# Patient Record
Sex: FEMALE | Race: WHITE | NOT HISPANIC OR LATINO | Employment: OTHER | URBAN - METROPOLITAN AREA
[De-identification: names, ages, dates, MRNs, and addresses within clinical notes are randomized per-mention and may not be internally consistent; named-entity substitution may affect disease eponyms.]

---

## 2019-06-26 ENCOUNTER — OFFICE VISIT (OUTPATIENT)
Dept: GYNECOLOGIC ONCOLOGY | Facility: CLINIC | Age: 59
End: 2019-06-26

## 2019-06-26 VITALS
WEIGHT: 197 LBS | OXYGEN SATURATION: 98 % | DIASTOLIC BLOOD PRESSURE: 73 MMHG | HEART RATE: 68 BPM | RESPIRATION RATE: 12 BRPM | BODY MASS INDEX: 32.82 KG/M2 | HEIGHT: 65 IN | SYSTOLIC BLOOD PRESSURE: 130 MMHG

## 2019-06-26 DIAGNOSIS — D68.9 BLOOD CLOTTING DISORDER (HCC): ICD-10-CM

## 2019-06-26 DIAGNOSIS — I25.10 CORONARY ARTERY DISEASE INVOLVING NATIVE CORONARY ARTERY OF NATIVE HEART WITHOUT ANGINA PECTORIS: ICD-10-CM

## 2019-06-26 DIAGNOSIS — Z79.82 ASPIRIN LONG-TERM USE: ICD-10-CM

## 2019-06-26 DIAGNOSIS — N85.02 COMPLEX ATYPICAL ENDOMETRIAL HYPERPLASIA: Primary | ICD-10-CM

## 2019-06-26 PROCEDURE — 99205 OFFICE O/P NEW HI 60 MIN: CPT | Performed by: OBSTETRICS & GYNECOLOGY

## 2019-06-26 NOTE — PROGRESS NOTES
Paty Marie  [id number]  1960      Reason for visit:  Complex endometrial hyperplasia with atypia on biopsy, postmenopausal bleeding    Consultation:  Patient is being seen at the request of Dr. Chadwick    History of present illness:  The patient is a 58 y.o. year old female who presents today for treatment and evaluation of the above issues.     Patient thinks that she went through menopause at 53 yo. She states that she has had light vaginal bleeding every 4-5 months for the past 3 years. She does have chronic neck, back pain and reports that she has had left sided intermittent left sided pelvic pain. It is a pressure like pain that can lasts up to a day. She also endorses hematuria. Denies fatigue, chest pain, shortness of air, nausea, vomiting, decreased appetite. A TVUS 19 showed endometrial thickness of 8.8 mm, endometrial biopsy was then performed that showed complex endometrial hyperplasia with atypia. She also complains of vaginal itching and was told that she has lichen sclerosis. This was last year and she used clobetasol cream at that time.     She does have coronary artery disease and is taking Aspirin 325mg daily.  She reports she may have antiphospholipid syndrome and developed a hematoma after cardiac catheterization and underwent work-up with Dr. Davalos at that time.  This was several years ago.  At the time of her cardiac catheterization she was noted to have 30% stenosis and required no other intervention.  She sees Dr. Tillman in [place] for cardiac issues.    For new patients, Pending sale to [facility] intake form from 19 was reviewed and confirmed today.    OBGYN History:  She is a .  She does not use HRT. She does not have a history of abnormal pap smears. Pap smear 2019 normal. Menopause at 51yo.       Oncologic History:   No history exists.         Past Medical History:   Diagnosis Date   • Arthritis    • Blood clotting disorder (CMS/HCC)    • Diabetes (CMS/HCC)    • Diastolic dysfunction     • Hypertension    • Lupus anticoagulant disorder (CMS/HCC)        Past Surgical History:   Procedure Laterality Date   • LAPAROSCOPIC TUBAL LIGATION     • NECK SURGERY     • SKIN CANCER EXCISION         MEDICATIONS: The current medication list was reviewed with the patient and updated in the EMR this date per the Medical Assistant. Medication dosages and frequencies were confirmed to be accurate.      Allergies:  has No Known Allergies.    Social History:   Social History     Socioeconomic History   • Marital status:      Spouse name: Not on file   • Number of children: 2   • Years of education: Not on file   • Highest education level: Not on file   Tobacco Use   • Smoking status: Light Tobacco Smoker     Types: Electronic Cigarette   Substance and Sexual Activity   • Alcohol use: No     Frequency: Never   • Drug use: Defer   • Sexual activity: Defer       Family History:  No family history on file.    Health Maintenance:    Health Maintenance   Topic Date Due   • ANNUAL PHYSICAL  07/05/1965   • PNEUMOCOCCAL VACCINE (19-64 MEDIUM RISK) (1 of 1 - PPSV23) 07/05/1981   • TDAP/TD VACCINES (1 - Tdap) 07/05/1981   • ZOSTER VACCINE (1 of 2) 07/05/2012   • HEPATITIS C SCREENING  06/26/2019   • COLONOSCOPY  06/26/2019   • INFLUENZA VACCINE  08/01/2019   • PAP SMEAR  06/26/2022         Review of Systems   Constitutional: Negative for activity change, appetite change and unexpected weight change.   HENT: Negative for congestion, sinus pressure, sinus pain, sneezing and sore throat.    Eyes: Negative for discharge and itching.   Respiratory: Negative for cough, shortness of breath and wheezing.    Cardiovascular: Negative for chest pain and palpitations.   Gastrointestinal: Negative for abdominal distention, abdominal pain, blood in stool, constipation, diarrhea, nausea and vomiting.   Endocrine: Negative for cold intolerance and heat intolerance.   Genitourinary: Positive for pelvic pain and vaginal bleeding.  "Negative for dysuria and frequency.   Musculoskeletal: Positive for back pain and neck pain. Negative for joint swelling.   Skin: Negative for color change and rash.   Allergic/Immunologic: Negative for environmental allergies and food allergies.   Neurological: Negative for weakness, light-headedness and numbness.   Hematological: Negative for adenopathy. Does not bruise/bleed easily.   Psychiatric/Behavioral: Negative for agitation, decreased concentration, dysphoric mood and hallucinations.       Physical Exam    Vitals:    06/26/19 1410   BP: 130/73   Pulse: 68   Resp: 12   SpO2: 98%   Weight: 89.4 kg (197 lb)   Height: 165.1 cm (65\")   PainSc:   5     Body mass index is 32.78 kg/m².    Wt Readings from Last 3 Encounters:   06/26/19 89.4 kg (197 lb)   09/29/14 85.7 kg (189 lb 0 oz)         GENERAL: Alert, well-appearing female appearing her stated age who is in no apparent distress.   HEENT: Sclera anicteric. Head normocephalic, atraumatic. Mucus membranes moist.   NECK: Trachea midline, supple, without masses.  No thyromegaly.   BREASTS: Deferred  CARDIOVASCULAR: Normal rate, regular rhythm, no murmurs, rubs, or gallops. No peripheral edema.  RESPIRATORY: Clear to auscultation bilaterally, normal respiratory effort  BACK:  No CVA tenderness, no vertebral tenderness on palpation  GASTROINTESTINAL:  Abdomen is soft, non-tender, non-distended, no rebound or guarding, no masses, or hernias. No HSM.    SKIN:  Warm, dry, well-perfused.  All visible areas intact.  No rashes, lesions, ulcers.  PSYCHIATRIC: AO x3, with appropriate affect, normal thought processes.  NEUROLOGIC: No focal deficits. Moves extremities well.  MUSCULOSKELETAL: Normal gait and station.   EXTREMITIES:   No cyanosis, clubbing, symmetric.  LYMPHATICS:  No cervical or inguinal adenopathy noted.     PELVIC exam:    GYNECOLOGIC:  External genitalia with white skin changes at vaginal introitus and in anal region. On speculum examination, the cervix " was free from lesion. On bimanual examination no mass was appreciated.  Uterus was normal in size and shape. There is no cervical motion or uterine tenderness. No cervical mass was palpated. Parametria were smooth. Rectovaginal exam was deferred.     PROCEDURES: none     Diagnostic Data:     No Images in the past 120 days found..    Lab Results   Component Value Date    WBC 5.75 09/08/2016    HGB 12.8 09/08/2016    HCT 40.7 09/08/2016    MCV 91.5 09/08/2016     09/08/2016    NEUTROABS 2.68 09/08/2016    GLUCOSE 101 09/08/2016    BUN 9 09/08/2016    CREATININE 0.79 09/08/2016    EGFRIFNONA 76 09/08/2016    EGFRIFAFRI  09/08/2016      Comment:      <15 Indicative of kidney failure.     09/08/2016    K 3.9 09/08/2016     09/08/2016    CO2 30.7 09/08/2016    CALCIUM 9.4 09/08/2016    ALBUMIN 4.50 09/08/2016    AST 24 09/08/2016    ALT 23 09/08/2016    BILITOT 0.4 09/08/2016     No results found for:         Assessment/Plan   This is a 58 y.o. woman with complex endometrial hyperplasia with atypia on biopsy, postmenopausal bleeding, and lichen sclerosus.  Comorbidities as noted below.    Complex endometrial hyperplasia with atypia and lichen sclerosus  Patient's risk of associated cancer with complex endometrial hyperplasia on endometrial biopsy is 25 to 40%.  This was discussed with her.    Patient was consented for robotic total laparoscopic hysterectomy, bilateral salpingo-oophorectomy, possible cancer staging, possible vulvar biopsy     Risks and benefits of surgery were discussed.  This included, but was not limited to, infection and bleeding like when the skin is cut; damage to surrounding structures; and incisional complications.  Risk of DVT was addressed for major surgeries.  Standard of care efforts to minimize these risks were reviewed.  Typical hospital stay and recovery were discussed as well as post-procedure precautions.  Surgical implications of chronic illnesses on recovery and  surgical outcome were reviewed.     Pain medication regimen for postoperative care was discussed.  Typical regimen and avoidance of narcotics was discussed.  Patient was educated that other factors, such as existing narcotic use, can impact postoperative pain management.      Risks and benefits of lymph node dissection were further discussed.  This included lymphocyst, hematoma, lymphedema, vascular injury, and nerve injury.    Patient verbalized understanding of the plan including the risks and benefits.  Appropriate perioperative testing including laboratory evaluation, EKG as clinically indicated, chest x-ray as clinically indicated, and preadmission evaluation were all ordered as a part of this patient's care.    Coronary artery disease  Patient is to contact and be cleared by cardiologist (Dr. Tillman in [place]) and discuss aspirin therapy with surgery.  Patient is currently on  mg daily.    Possible antiphospholipid syndrome  Patient reports that she developed a hematoma after cardiac catheterization which occurred a few years ago.  She was seen by Dr. Davalos and was started on aspirin for which she thinks was antiphospholipid syndrome.  I would like for her to call  and get clarification regarding her current aspirin dose perioperatively.  I would feel more comfortable if patient was on a lower dose of aspirin or off anticoagulation perioperatively as she may require lymph node dissection as a part of her surgery.  This places her increased risk for bleeding complications.    Pain assessment was performed today as a part of patient’s care.  For patients with pain related to surgery, gynecologic malignancy or cancer treatment, the plan is as noted in the assessment/plan.  For patients with pain not related to these issues, they are to seek any further needed care from a more appropriate provider, such as PCP.    No orders of the defined types were placed in this encounter.    FOLLOW UP: Surgery      I personally spent 60 minutes face-to-face with the patient of which >50% was spent performing counseling/coordiantion of care.    Patient was seen and examined with Dr. Santiago,  resident, who performed portions of the examination and documentation for this patient's care under my direct supervision.  I agree with the above documentation and plan.    Jennifer Holman MD  06/27/19  7:52 AM

## 2019-06-27 PROBLEM — I25.10 CORONARY ARTERY DISEASE INVOLVING NATIVE CORONARY ARTERY OF NATIVE HEART WITHOUT ANGINA PECTORIS: Status: ACTIVE | Noted: 2019-06-27

## 2019-06-27 PROBLEM — Z79.82 ASPIRIN LONG-TERM USE: Status: ACTIVE | Noted: 2019-06-27

## 2019-06-27 PROBLEM — D68.9 BLOOD CLOTTING DISORDER (HCC): Status: ACTIVE | Noted: 2019-06-27

## 2019-06-27 PROBLEM — D68.62 LUPUS ANTICOAGULANT DISORDER (HCC): Status: ACTIVE | Noted: 2019-06-27

## 2019-06-28 DIAGNOSIS — N85.02 COMPLEX ATYPICAL ENDOMETRIAL HYPERPLASIA: ICD-10-CM

## 2019-06-28 DIAGNOSIS — Z79.82 ASPIRIN LONG-TERM USE: Primary | ICD-10-CM

## 2019-07-02 ENCOUNTER — TELEPHONE (OUTPATIENT)
Dept: GYNECOLOGIC ONCOLOGY | Facility: CLINIC | Age: 59
End: 2019-07-02

## 2019-07-02 NOTE — TELEPHONE ENCOUNTER
Returned call to [facility] with Dr. Davalos's office, states pt has never been seen there before, they received some paperwork on her but she was never seen.

## 2019-07-02 NOTE — TELEPHONE ENCOUNTER
Phoned pt regarding being seen by Dr. Davalos, I told her her [facility] said she had never been seen there before, pt states she has, she has to go into town for a script, she will go by their office to straighten it out and call me back.

## 2019-07-05 ENCOUNTER — TELEPHONE (OUTPATIENT)
Dept: GYNECOLOGIC ONCOLOGY | Facility: CLINIC | Age: 59
End: 2019-07-05

## 2019-07-11 ENCOUNTER — TELEPHONE (OUTPATIENT)
Dept: GYNECOLOGIC ONCOLOGY | Facility: CLINIC | Age: 59
End: 2019-07-11

## 2019-07-17 ENCOUNTER — TELEPHONE (OUTPATIENT)
Dept: GYNECOLOGIC ONCOLOGY | Facility: CLINIC | Age: 59
End: 2019-07-17

## 2019-07-17 NOTE — TELEPHONE ENCOUNTER
Phoned pt regarding appointment with Dr. Davalos, pt states went to their office after speaking with me that day and was seen yesterday by Dr. Davalos and clearance will be faxed when ready.  I asked her why they said they had never seen her, she said they had changed systems and did not have her records.  She also has an appointment with Dr. Houston on 8-8-19 for clearance, tentative date for surgery set for 8-30-19 per pt's request.

## 2019-08-12 ENCOUNTER — TELEPHONE (OUTPATIENT)
Dept: GYNECOLOGIC ONCOLOGY | Facility: CLINIC | Age: 59
End: 2019-08-12

## 2019-08-20 ENCOUNTER — TELEPHONE (OUTPATIENT)
Dept: GYNECOLOGIC ONCOLOGY | Facility: CLINIC | Age: 59
End: 2019-08-20

## 2019-08-21 ENCOUNTER — PREP FOR SURGERY (OUTPATIENT)
Dept: GYNECOLOGIC ONCOLOGY | Facility: CLINIC | Age: 59
End: 2019-08-21

## 2019-08-21 DIAGNOSIS — L90.0 LICHEN SCLEROSUS: ICD-10-CM

## 2019-08-21 DIAGNOSIS — N85.02 COMPLEX ATYPICAL ENDOMETRIAL HYPERPLASIA: Primary | ICD-10-CM

## 2019-08-21 RX ORDER — ACETAMINOPHEN 325 MG/1
650 TABLET ORAL ONCE
Status: CANCELLED | OUTPATIENT
Start: 2019-08-21

## 2019-08-21 RX ORDER — SODIUM CHLORIDE, SODIUM LACTATE, POTASSIUM CHLORIDE, CALCIUM CHLORIDE 600; 310; 30; 20 MG/100ML; MG/100ML; MG/100ML; MG/100ML
100 INJECTION, SOLUTION INTRAVENOUS CONTINUOUS
Status: CANCELLED | OUTPATIENT
Start: 2019-08-21

## 2019-08-21 RX ORDER — PREGABALIN 25 MG/1
150 CAPSULE ORAL ONCE
Status: CANCELLED | OUTPATIENT
Start: 2019-08-21 | End: 2019-08-21

## 2019-08-21 RX ORDER — CELECOXIB 100 MG/1
200 CAPSULE ORAL ONCE
Status: CANCELLED | OUTPATIENT
Start: 2019-08-21

## 2019-08-23 ENCOUNTER — PATIENT EDUCATION (SURGERY INSTRUCTIONS) (OUTPATIENT)
Dept: GYNECOLOGIC ONCOLOGY | Facility: CLINIC | Age: 59
End: 2019-08-23

## 2019-08-23 NOTE — PATIENT INSTRUCTIONS
Gynecologic Oncology  Inpatient Pre-op Patient Education  *See checked boxes for your instructions*    Patient Name:  Paty Marie  [id number]  1960    Surgeon:      Appointment  [x]  1. Your surgery has been scheduled on 8-30-19. You will need to be at the second floor surgery registration of the [facility] hospital on that day at 6:00 am. Enter the campus grounds through entrance #2, park in [facility], walk up the hill into the hospital and follow that holder until you see elevators on right, use that elevator to go to the 2nd floor, when the door opens, you will see an arrow to direct you to registration.   [x] 2.  You have a pre-admission testing (PAT) appointment for labs and possibly chest xray and EKG, on 8-29-19 at 3:00 pm, you do not need to be fasting for that appointment.  You will need to be at hospital registration on the first floor, 10 minutes before that time.     [x] 3.  The hospital registration department is located in the long hallway between the [facility] and [facility] buildings.     The Day(s) Before Surgery  [x] 1. On 8-29-19, the day prior to surgery, eat lightly.  No solid food after midnight on 8-29-19, including NO MILK, CREAM, OR ORANGE JUICE.  You may have sips of clear fluids up until two-three hours prior to your arrival to the hospital on the morning of surgery.     [] 2.  You may need to use a stool softener, the day prior to surgery to help with existing constipation and to clean out your bowels.  You can purchase Miralax over-the-counter at the pharmacy and follow the directions on the back.  (Do not do this step unless the box is checked).   [x] 3.  Do not take vitamins or full dose aspirin one week before surgery.  If you normally take a blood thinning medication such as Warfarin, Eliquis, or Xarelto, we will give you specific instructions regarding these medications and we may need to talk with your other doctors.   [x] 4.  On the morning of your surgery, you may likely take  your routine prescription medications with a sip of water as reviewed with you by your surgeon.  Bring your home medications with you to the hospital as we may need to reference these.  In particular be sure to bring any inhalers.     Post-surgery Instructions  [x] 1.  The length of stay for your type of surgery is typically one hospital night, however it is also possible that you could be discharged home in the evening on the same day depending on the nature of your surgery.  All rooms are private, so family member may stay with you.     [x] 2.  Do not take your own home prescription medication while you are in the hospital unless otherwise instructed.  These will be provided to you.         Comments:  Please remove fingernail polish, you may leave toenail polish on.

## 2019-08-28 ENCOUNTER — DOCUMENTATION (OUTPATIENT)
Dept: GYNECOLOGIC ONCOLOGY | Facility: CLINIC | Age: 59
End: 2019-08-28

## 2019-08-28 ENCOUNTER — TELEPHONE (OUTPATIENT)
Dept: GYNECOLOGIC ONCOLOGY | Facility: CLINIC | Age: 59
End: 2019-08-28

## 2019-08-28 RX ORDER — CLONAZEPAM 0.5 MG/1
0.5 TABLET ORAL 3 TIMES DAILY PRN
COMMUNITY

## 2019-08-28 RX ORDER — METFORMIN HYDROCHLORIDE 500 MG/1
500 TABLET, EXTENDED RELEASE ORAL 2 TIMES DAILY
COMMUNITY

## 2019-08-28 RX ORDER — ASPIRIN 325 MG
325 TABLET ORAL DAILY
COMMUNITY
End: 2020-05-14

## 2019-08-28 RX ORDER — POTASSIUM CHLORIDE 750 MG/1
10 TABLET, FILM COATED, EXTENDED RELEASE ORAL 2 TIMES DAILY
COMMUNITY

## 2019-08-28 RX ORDER — LISINOPRIL 10 MG/1
10 TABLET ORAL DAILY
COMMUNITY

## 2019-08-28 RX ORDER — LEVOTHYROXINE SODIUM 0.05 MG/1
75 TABLET ORAL DAILY
COMMUNITY
End: 2021-12-20

## 2019-08-28 RX ORDER — HYDROCHLOROTHIAZIDE 12.5 MG/1
12.5 TABLET ORAL DAILY
Status: ON HOLD | COMMUNITY
End: 2019-08-30

## 2019-08-28 RX ORDER — ATORVASTATIN CALCIUM 40 MG/1
40 TABLET, FILM COATED ORAL DAILY
COMMUNITY

## 2019-08-28 NOTE — TELEPHONE ENCOUNTER
Returned pt's call, states has been having blood pressure problems, it has been dropping when she stands, they have adjusted medications, she has appointment today at 2430 with PCP, instructed pt needs to call with update after sees PCP, pt v/u, will do as instructed.

## 2019-08-28 NOTE — TELEPHONE ENCOUNTER
I called and spoke with a nurse. She states mixing studies are normal and they will fax a clearance to our office. I provided the number and will wait on that.

## 2019-08-29 ENCOUNTER — TELEPHONE (OUTPATIENT)
Dept: GYNECOLOGIC ONCOLOGY | Facility: CLINIC | Age: 59
End: 2019-08-29

## 2019-08-29 ENCOUNTER — APPOINTMENT (OUTPATIENT)
Dept: PREADMISSION TESTING | Facility: HOSPITAL | Age: 59
End: 2019-08-29

## 2019-08-29 ENCOUNTER — ANESTHESIA EVENT (OUTPATIENT)
Dept: PERIOP | Facility: HOSPITAL | Age: 59
End: 2019-08-29

## 2019-08-29 RX ORDER — FAMOTIDINE 10 MG/ML
20 INJECTION, SOLUTION INTRAVENOUS ONCE
Status: CANCELLED | OUTPATIENT
Start: 2019-08-29 | End: 2019-08-29

## 2019-08-29 NOTE — TELEPHONE ENCOUNTER
Phoned pt's daughter Sofia since pt not available, informed her per Dr. Holman's v/o she may have bleeding issues due to not stopping the ASA soon enough, Sofia v/u, will inform her Mom.

## 2019-08-30 ENCOUNTER — ANESTHESIA (OUTPATIENT)
Dept: PERIOP | Facility: HOSPITAL | Age: 59
End: 2019-08-30

## 2019-08-30 ENCOUNTER — APPOINTMENT (OUTPATIENT)
Dept: GENERAL RADIOLOGY | Facility: HOSPITAL | Age: 59
End: 2019-08-30

## 2019-08-30 ENCOUNTER — HOSPITAL ENCOUNTER (OUTPATIENT)
Facility: HOSPITAL | Age: 59
Setting detail: SURGERY ADMIT
Discharge: HOME | End: 2019-08-30
Attending: OBSTETRICS & GYNECOLOGY | Admitting: OBSTETRICS & GYNECOLOGY

## 2019-08-30 VITALS
HEART RATE: 54 BPM | HEIGHT: 65 IN | TEMPERATURE: 97.6 F | OXYGEN SATURATION: 94 % | SYSTOLIC BLOOD PRESSURE: 123 MMHG | RESPIRATION RATE: 18 BRPM | WEIGHT: 197 LBS | DIASTOLIC BLOOD PRESSURE: 68 MMHG | BODY MASS INDEX: 32.82 KG/M2

## 2019-08-30 DIAGNOSIS — L90.0 LICHEN SCLEROSUS: ICD-10-CM

## 2019-08-30 DIAGNOSIS — N85.02 COMPLEX ATYPICAL ENDOMETRIAL HYPERPLASIA: ICD-10-CM

## 2019-08-30 LAB
ABO GROUP BLD: NORMAL
ALBUMIN SERPL-MCNC: 4.1 G/DL (ref 3.5–5.2)
ALBUMIN/GLOB SERPL: 1.7 G/DL
ALP SERPL-CCNC: 61 U/L (ref 39–117)
ALT SERPL W P-5'-P-CCNC: 15 U/L (ref 1–33)
ANION GAP SERPL CALCULATED.3IONS-SCNC: 12 MMOL/L (ref 5–15)
AST SERPL-CCNC: 18 U/L (ref 1–32)
BASOPHILS # BLD AUTO: 0.07 10*3/MM3 (ref 0–0.2)
BASOPHILS NFR BLD AUTO: 0.8 % (ref 0–1.5)
BILIRUB SERPL-MCNC: 0.2 MG/DL (ref 0.2–1.2)
BLD GP AB SCN SERPL QL: NEGATIVE
BUN BLD-MCNC: 11 MG/DL (ref 6–20)
BUN/CREAT SERPL: 13.3 (ref 7–25)
CALCIUM SPEC-SCNC: 9.2 MG/DL (ref 8.6–10.5)
CHLORIDE SERPL-SCNC: 105 MMOL/L (ref 98–107)
CO2 SERPL-SCNC: 26 MMOL/L (ref 22–29)
CREAT BLD-MCNC: 0.83 MG/DL (ref 0.57–1)
DEPRECATED RDW RBC AUTO: 43.3 FL (ref 37–54)
EOSINOPHIL # BLD AUTO: 1.13 10*3/MM3 (ref 0–0.4)
EOSINOPHIL NFR BLD AUTO: 13 % (ref 0.3–6.2)
ERYTHROCYTE [DISTWIDTH] IN BLOOD BY AUTOMATED COUNT: 13.1 % (ref 12.3–15.4)
GFR SERPL CREATININE-BSD FRML MDRD: 71 ML/MIN/1.73
GLOBULIN UR ELPH-MCNC: 2.4 GM/DL
GLUCOSE BLD-MCNC: 136 MG/DL (ref 65–99)
GLUCOSE BLDC GLUCOMTR-MCNC: 121 MG/DL (ref 70–130)
HCT VFR BLD AUTO: 40 % (ref 34–46.6)
HGB BLD-MCNC: 12.4 G/DL (ref 12–15.9)
IMM GRANULOCYTES # BLD AUTO: 0.02 10*3/MM3 (ref 0–0.05)
IMM GRANULOCYTES NFR BLD AUTO: 0.2 % (ref 0–0.5)
LYMPHOCYTES # BLD AUTO: 2.72 10*3/MM3 (ref 0.7–3.1)
LYMPHOCYTES NFR BLD AUTO: 31.3 % (ref 19.6–45.3)
MCH RBC QN AUTO: 28.2 PG (ref 26.6–33)
MCHC RBC AUTO-ENTMCNC: 31 G/DL (ref 31.5–35.7)
MCV RBC AUTO: 91.1 FL (ref 79–97)
MONOCYTES # BLD AUTO: 0.7 10*3/MM3 (ref 0.1–0.9)
MONOCYTES NFR BLD AUTO: 8.1 % (ref 5–12)
NEUTROPHILS # BLD AUTO: 4.04 10*3/MM3 (ref 1.7–7)
NEUTROPHILS NFR BLD AUTO: 46.6 % (ref 42.7–76)
NRBC BLD AUTO-RTO: 0 /100 WBC (ref 0–0.2)
PLATELET # BLD AUTO: 353 10*3/MM3 (ref 140–450)
PMV BLD AUTO: 10.4 FL (ref 6–12)
POTASSIUM BLD-SCNC: 4 MMOL/L (ref 3.5–5.2)
PROT SERPL-MCNC: 6.5 G/DL (ref 6–8.5)
RBC # BLD AUTO: 4.39 10*6/MM3 (ref 3.77–5.28)
RH BLD: POSITIVE
SODIUM BLD-SCNC: 143 MMOL/L (ref 136–145)
T&S EXPIRATION DATE: NORMAL
WBC NRBC COR # BLD: 8.68 10*3/MM3 (ref 3.4–10.8)

## 2019-08-30 PROCEDURE — 25010000002 FENTANYL CITRATE (PF) 100 MCG/2ML SOLUTION: Performed by: NURSE ANESTHETIST, CERTIFIED REGISTERED

## 2019-08-30 PROCEDURE — 88309 TISSUE EXAM BY PATHOLOGIST: CPT | Performed by: OBSTETRICS & GYNECOLOGY

## 2019-08-30 PROCEDURE — 86850 RBC ANTIBODY SCREEN: CPT | Performed by: OBSTETRICS & GYNECOLOGY

## 2019-08-30 PROCEDURE — 56606 BIOPSY OF VULVA/PERINEUM: CPT | Performed by: OBSTETRICS & GYNECOLOGY

## 2019-08-30 PROCEDURE — 58571 TLH W/T/O 250 G OR LESS: CPT | Performed by: OBSTETRICS & GYNECOLOGY

## 2019-08-30 PROCEDURE — 71045 X-RAY EXAM CHEST 1 VIEW: CPT

## 2019-08-30 PROCEDURE — 85025 COMPLETE CBC W/AUTO DIFF WBC: CPT | Performed by: OBSTETRICS & GYNECOLOGY

## 2019-08-30 PROCEDURE — 25010000002 PROPOFOL 10 MG/ML EMULSION: Performed by: NURSE ANESTHETIST, CERTIFIED REGISTERED

## 2019-08-30 PROCEDURE — 56605 BIOPSY OF VULVA/PERINEUM: CPT | Performed by: OBSTETRICS & GYNECOLOGY

## 2019-08-30 PROCEDURE — 80053 COMPREHEN METABOLIC PANEL: CPT | Performed by: OBSTETRICS & GYNECOLOGY

## 2019-08-30 PROCEDURE — 25010000002 NEOSTIGMINE 10 MG/10ML SOLUTION: Performed by: NURSE ANESTHETIST, CERTIFIED REGISTERED

## 2019-08-30 PROCEDURE — 86900 BLOOD TYPING SEROLOGIC ABO: CPT

## 2019-08-30 PROCEDURE — S0260 H&P FOR SURGERY: HCPCS | Performed by: OBSTETRICS & GYNECOLOGY

## 2019-08-30 PROCEDURE — 93005 ELECTROCARDIOGRAM TRACING: CPT | Performed by: OBSTETRICS & GYNECOLOGY

## 2019-08-30 PROCEDURE — 25010000002 DEXAMETHASONE PER 1 MG: Performed by: NURSE ANESTHETIST, CERTIFIED REGISTERED

## 2019-08-30 PROCEDURE — 25010000002 PROPOFOL 1000 MG/ML EMULSION: Performed by: NURSE ANESTHETIST, CERTIFIED REGISTERED

## 2019-08-30 PROCEDURE — 86900 BLOOD TYPING SEROLOGIC ABO: CPT | Performed by: OBSTETRICS & GYNECOLOGY

## 2019-08-30 PROCEDURE — 88304 TISSUE EXAM BY PATHOLOGIST: CPT | Performed by: OBSTETRICS & GYNECOLOGY

## 2019-08-30 PROCEDURE — 88331 PATH CONSLTJ SURG 1 BLK 1SPC: CPT | Performed by: PATHOLOGY

## 2019-08-30 PROCEDURE — 82962 GLUCOSE BLOOD TEST: CPT

## 2019-08-30 PROCEDURE — 86901 BLOOD TYPING SEROLOGIC RH(D): CPT | Performed by: OBSTETRICS & GYNECOLOGY

## 2019-08-30 PROCEDURE — 86901 BLOOD TYPING SEROLOGIC RH(D): CPT

## 2019-08-30 PROCEDURE — 25010000002 PROMETHAZINE PER 50 MG: Performed by: NURSE ANESTHETIST, CERTIFIED REGISTERED

## 2019-08-30 RX ORDER — FAMOTIDINE 20 MG/1
20 TABLET, FILM COATED ORAL ONCE
Status: COMPLETED | OUTPATIENT
Start: 2019-08-30 | End: 2019-08-30

## 2019-08-30 RX ORDER — SENNOSIDES 8.6 MG
650 CAPSULE ORAL EVERY 8 HOURS PRN
Qty: 30 TABLET | Refills: 0 | Status: SHIPPED | OUTPATIENT
Start: 2019-08-30 | End: 2019-09-19

## 2019-08-30 RX ORDER — EPHEDRINE SULFATE 50 MG/ML
5 INJECTION, SOLUTION INTRAVENOUS ONCE AS NEEDED
Status: DISCONTINUED | OUTPATIENT
Start: 2019-08-30 | End: 2019-08-30 | Stop reason: HOSPADM

## 2019-08-30 RX ORDER — HYDROCODONE BITARTRATE AND ACETAMINOPHEN 10; 325 MG/1; MG/1
1 TABLET ORAL ONCE AS NEEDED
Status: COMPLETED | OUTPATIENT
Start: 2019-08-30 | End: 2019-08-30

## 2019-08-30 RX ORDER — DEXAMETHASONE SODIUM PHOSPHATE 4 MG/ML
INJECTION, SOLUTION INTRA-ARTICULAR; INTRALESIONAL; INTRAMUSCULAR; INTRAVENOUS; SOFT TISSUE AS NEEDED
Status: DISCONTINUED | OUTPATIENT
Start: 2019-08-30 | End: 2019-08-30 | Stop reason: SURG

## 2019-08-30 RX ORDER — PROMETHAZINE HYDROCHLORIDE 25 MG/ML
12.5 INJECTION, SOLUTION INTRAMUSCULAR; INTRAVENOUS ONCE AS NEEDED
Status: DISCONTINUED | OUTPATIENT
Start: 2019-08-30 | End: 2019-08-30 | Stop reason: HOSPADM

## 2019-08-30 RX ORDER — LIDOCAINE HYDROCHLORIDE 10 MG/ML
0.5 INJECTION, SOLUTION EPIDURAL; INFILTRATION; INTRACAUDAL; PERINEURAL ONCE AS NEEDED
Status: COMPLETED | OUTPATIENT
Start: 2019-08-30 | End: 2019-08-30

## 2019-08-30 RX ORDER — PROPOFOL 10 MG/ML
VIAL (ML) INTRAVENOUS AS NEEDED
Status: DISCONTINUED | OUTPATIENT
Start: 2019-08-30 | End: 2019-08-30 | Stop reason: SURG

## 2019-08-30 RX ORDER — FENTANYL CITRATE 50 UG/ML
50 INJECTION, SOLUTION INTRAMUSCULAR; INTRAVENOUS
Status: DISCONTINUED | OUTPATIENT
Start: 2019-08-30 | End: 2019-08-30 | Stop reason: HOSPADM

## 2019-08-30 RX ORDER — GLYCOPYRROLATE 0.2 MG/ML
INJECTION INTRAMUSCULAR; INTRAVENOUS AS NEEDED
Status: DISCONTINUED | OUTPATIENT
Start: 2019-08-30 | End: 2019-08-30 | Stop reason: SURG

## 2019-08-30 RX ORDER — HYDROCODONE BITARTRATE AND ACETAMINOPHEN 10; 325 MG/1; MG/1
1 TABLET ORAL EVERY 6 HOURS PRN
COMMUNITY
End: 2019-08-30 | Stop reason: HOSPADM

## 2019-08-30 RX ORDER — NALOXONE HCL 0.4 MG/ML
0.4 VIAL (ML) INJECTION AS NEEDED
Status: DISCONTINUED | OUTPATIENT
Start: 2019-08-30 | End: 2019-08-30 | Stop reason: HOSPADM

## 2019-08-30 RX ORDER — ACETAMINOPHEN 325 MG/1
650 TABLET ORAL ONCE
Status: COMPLETED | OUTPATIENT
Start: 2019-08-30 | End: 2019-08-30

## 2019-08-30 RX ORDER — LIDOCAINE HYDROCHLORIDE 10 MG/ML
INJECTION, SOLUTION EPIDURAL; INFILTRATION; INTRACAUDAL; PERINEURAL AS NEEDED
Status: DISCONTINUED | OUTPATIENT
Start: 2019-08-30 | End: 2019-08-30 | Stop reason: SURG

## 2019-08-30 RX ORDER — NEOSTIGMINE METHYLSULFATE 1 MG/ML
INJECTION, SOLUTION INTRAVENOUS AS NEEDED
Status: DISCONTINUED | OUTPATIENT
Start: 2019-08-30 | End: 2019-08-30 | Stop reason: SURG

## 2019-08-30 RX ORDER — OXYCODONE HYDROCHLORIDE 5 MG/1
5 TABLET ORAL EVERY 4 HOURS PRN
Qty: 10 TABLET | Refills: 0 | Status: SHIPPED | OUTPATIENT
Start: 2019-08-30 | End: 2019-09-19

## 2019-08-30 RX ORDER — CEFAZOLIN SODIUM IN 0.9 % NACL 3 G/100 ML
3 INTRAVENOUS SOLUTION, PIGGYBACK (ML) INTRAVENOUS ONCE
Status: COMPLETED | OUTPATIENT
Start: 2019-08-30 | End: 2019-08-30

## 2019-08-30 RX ORDER — PREGABALIN 150 MG/1
150 CAPSULE ORAL ONCE
Status: COMPLETED | OUTPATIENT
Start: 2019-08-30 | End: 2019-08-30

## 2019-08-30 RX ORDER — PROMETHAZINE HYDROCHLORIDE 25 MG/1
12.5 TABLET ORAL EVERY 6 HOURS PRN
Status: DISCONTINUED | OUTPATIENT
Start: 2019-08-30 | End: 2019-08-30 | Stop reason: HOSPADM

## 2019-08-30 RX ORDER — LABETALOL HYDROCHLORIDE 5 MG/ML
5 INJECTION, SOLUTION INTRAVENOUS
Status: DISCONTINUED | OUTPATIENT
Start: 2019-08-30 | End: 2019-08-30 | Stop reason: HOSPADM

## 2019-08-30 RX ORDER — SODIUM CHLORIDE, SODIUM LACTATE, POTASSIUM CHLORIDE, CALCIUM CHLORIDE 600; 310; 30; 20 MG/100ML; MG/100ML; MG/100ML; MG/100ML
100 INJECTION, SOLUTION INTRAVENOUS CONTINUOUS
Status: DISCONTINUED | OUTPATIENT
Start: 2019-08-30 | End: 2019-08-30 | Stop reason: HOSPADM

## 2019-08-30 RX ORDER — SODIUM CHLORIDE 9 MG/ML
INJECTION, SOLUTION INTRAVENOUS AS NEEDED
Status: DISCONTINUED | OUTPATIENT
Start: 2019-08-30 | End: 2019-08-30 | Stop reason: HOSPADM

## 2019-08-30 RX ORDER — LABETALOL HYDROCHLORIDE 5 MG/ML
INJECTION, SOLUTION INTRAVENOUS AS NEEDED
Status: DISCONTINUED | OUTPATIENT
Start: 2019-08-30 | End: 2019-08-30 | Stop reason: SURG

## 2019-08-30 RX ORDER — HYDROMORPHONE HYDROCHLORIDE 1 MG/ML
0.5 INJECTION, SOLUTION INTRAMUSCULAR; INTRAVENOUS; SUBCUTANEOUS
Status: DISCONTINUED | OUTPATIENT
Start: 2019-08-30 | End: 2019-08-30 | Stop reason: HOSPADM

## 2019-08-30 RX ORDER — BUPIVACAINE HYDROCHLORIDE AND EPINEPHRINE 5; 5 MG/ML; UG/ML
INJECTION, SOLUTION PERINEURAL AS NEEDED
Status: DISCONTINUED | OUTPATIENT
Start: 2019-08-30 | End: 2019-08-30 | Stop reason: HOSPADM

## 2019-08-30 RX ORDER — PROMETHAZINE HYDROCHLORIDE 12.5 MG/1
12.5 TABLET ORAL EVERY 6 HOURS PRN
Qty: 5 TABLET | Refills: 0 | Status: SHIPPED | OUTPATIENT
Start: 2019-08-30 | End: 2019-09-19

## 2019-08-30 RX ORDER — ROCURONIUM BROMIDE 10 MG/ML
INJECTION, SOLUTION INTRAVENOUS AS NEEDED
Status: DISCONTINUED | OUTPATIENT
Start: 2019-08-30 | End: 2019-08-30 | Stop reason: SURG

## 2019-08-30 RX ORDER — DOCUSATE SODIUM 250 MG
250 CAPSULE ORAL 2 TIMES DAILY
Qty: 60 CAPSULE | Refills: 5 | Status: SHIPPED | OUTPATIENT
Start: 2019-08-30 | End: 2019-09-19

## 2019-08-30 RX ORDER — PROMETHAZINE HYDROCHLORIDE 12.5 MG/1
12.5 SUPPOSITORY RECTAL EVERY 6 HOURS PRN
Status: DISCONTINUED | OUTPATIENT
Start: 2019-08-30 | End: 2019-08-30 | Stop reason: HOSPADM

## 2019-08-30 RX ORDER — IBUPROFEN 600 MG/1
600 TABLET ORAL EVERY 6 HOURS PRN
Qty: 30 TABLET | Refills: 1 | Status: SHIPPED | OUTPATIENT
Start: 2019-08-30 | End: 2019-09-19

## 2019-08-30 RX ORDER — CELECOXIB 200 MG/1
200 CAPSULE ORAL ONCE
Status: COMPLETED | OUTPATIENT
Start: 2019-08-30 | End: 2019-08-30

## 2019-08-30 RX ORDER — FENTANYL CITRATE 50 UG/ML
INJECTION, SOLUTION INTRAMUSCULAR; INTRAVENOUS AS NEEDED
Status: DISCONTINUED | OUTPATIENT
Start: 2019-08-30 | End: 2019-08-30 | Stop reason: SURG

## 2019-08-30 RX ORDER — MEPERIDINE HYDROCHLORIDE 25 MG/ML
12.5 INJECTION INTRAMUSCULAR; INTRAVENOUS; SUBCUTANEOUS
Status: DISCONTINUED | OUTPATIENT
Start: 2019-08-30 | End: 2019-08-30 | Stop reason: HOSPADM

## 2019-08-30 RX ORDER — SODIUM CHLORIDE 0.9 % (FLUSH) 0.9 %
3-10 SYRINGE (ML) INJECTION AS NEEDED
Status: DISCONTINUED | OUTPATIENT
Start: 2019-08-30 | End: 2019-08-30 | Stop reason: HOSPADM

## 2019-08-30 RX ORDER — SCOLOPAMINE TRANSDERMAL SYSTEM 1 MG/1
1 PATCH, EXTENDED RELEASE TRANSDERMAL ONCE
Status: DISCONTINUED | OUTPATIENT
Start: 2019-08-30 | End: 2019-08-30 | Stop reason: HOSPADM

## 2019-08-30 RX ORDER — MAGNESIUM HYDROXIDE 1200 MG/15ML
LIQUID ORAL AS NEEDED
Status: DISCONTINUED | OUTPATIENT
Start: 2019-08-30 | End: 2019-08-30 | Stop reason: HOSPADM

## 2019-08-30 RX ORDER — SODIUM CHLORIDE 0.9 % (FLUSH) 0.9 %
3 SYRINGE (ML) INJECTION EVERY 12 HOURS SCHEDULED
Status: DISCONTINUED | OUTPATIENT
Start: 2019-08-30 | End: 2019-08-30 | Stop reason: HOSPADM

## 2019-08-30 RX ORDER — SODIUM CHLORIDE, SODIUM LACTATE, POTASSIUM CHLORIDE, CALCIUM CHLORIDE 600; 310; 30; 20 MG/100ML; MG/100ML; MG/100ML; MG/100ML
9 INJECTION, SOLUTION INTRAVENOUS CONTINUOUS
Status: DISCONTINUED | OUTPATIENT
Start: 2019-08-30 | End: 2019-08-30 | Stop reason: HOSPADM

## 2019-08-30 RX ORDER — PROMETHAZINE HYDROCHLORIDE 25 MG/ML
INJECTION, SOLUTION INTRAMUSCULAR; INTRAVENOUS AS NEEDED
Status: DISCONTINUED | OUTPATIENT
Start: 2019-08-30 | End: 2019-08-30 | Stop reason: SURG

## 2019-08-30 RX ADMIN — LABETALOL HYDROCHLORIDE 5 MG: 5 INJECTION, SOLUTION INTRAVENOUS at 08:53

## 2019-08-30 RX ADMIN — METOPROLOL TARTRATE 2.5 MG: 5 INJECTION, SOLUTION INTRAVENOUS at 08:37

## 2019-08-30 RX ADMIN — ACETAMINOPHEN 650 MG: 500 TABLET ORAL at 07:30

## 2019-08-30 RX ADMIN — PROPOFOL 150 MG: 10 INJECTION, EMULSION INTRAVENOUS at 08:00

## 2019-08-30 RX ADMIN — NEOSTIGMINE METHYLSULFATE 3 MG: 1 INJECTION, SOLUTION INTRAVENOUS at 09:49

## 2019-08-30 RX ADMIN — FENTANYL CITRATE 50 MCG: 50 INJECTION INTRAMUSCULAR; INTRAVENOUS at 10:30

## 2019-08-30 RX ADMIN — LABETALOL HYDROCHLORIDE 5 MG: 5 INJECTION, SOLUTION INTRAVENOUS at 08:45

## 2019-08-30 RX ADMIN — PREGABALIN 150 MG: 150 CAPSULE ORAL at 07:33

## 2019-08-30 RX ADMIN — LIDOCAINE HYDROCHLORIDE 0.2 ML: 10 INJECTION, SOLUTION EPIDURAL; INFILTRATION; INTRACAUDAL; PERINEURAL at 07:18

## 2019-08-30 RX ADMIN — HYDROCODONE BITARTRATE AND ACETAMINOPHEN 1 TABLET: 10; 325 TABLET ORAL at 11:40

## 2019-08-30 RX ADMIN — SODIUM CHLORIDE, POTASSIUM CHLORIDE, SODIUM LACTATE AND CALCIUM CHLORIDE: 600; 310; 30; 20 INJECTION, SOLUTION INTRAVENOUS at 09:30

## 2019-08-30 RX ADMIN — DEXAMETHASONE SODIUM PHOSPHATE 8 MG: 4 INJECTION, SOLUTION INTRAMUSCULAR; INTRAVENOUS at 08:00

## 2019-08-30 RX ADMIN — SODIUM CHLORIDE, POTASSIUM CHLORIDE, SODIUM LACTATE AND CALCIUM CHLORIDE 9 ML/HR: 600; 310; 30; 20 INJECTION, SOLUTION INTRAVENOUS at 07:18

## 2019-08-30 RX ADMIN — FENTANYL CITRATE 50 MCG: 50 INJECTION INTRAMUSCULAR; INTRAVENOUS at 10:45

## 2019-08-30 RX ADMIN — SODIUM CHLORIDE, POTASSIUM CHLORIDE, SODIUM LACTATE AND CALCIUM CHLORIDE 250 ML: 600; 310; 30; 20 INJECTION, SOLUTION INTRAVENOUS at 11:18

## 2019-08-30 RX ADMIN — FENTANYL CITRATE 100 MCG: 50 INJECTION, SOLUTION INTRAMUSCULAR; INTRAVENOUS at 08:00

## 2019-08-30 RX ADMIN — FAMOTIDINE 20 MG: 20 TABLET ORAL at 07:34

## 2019-08-30 RX ADMIN — CELECOXIB 200 MG: 200 CAPSULE ORAL at 07:33

## 2019-08-30 RX ADMIN — GLYCOPYRROLATE 0.4 MG: 0.2 INJECTION, SOLUTION INTRAMUSCULAR; INTRAVENOUS at 09:49

## 2019-08-30 RX ADMIN — LABETALOL HYDROCHLORIDE 5 MG: 5 INJECTION, SOLUTION INTRAVENOUS at 08:58

## 2019-08-30 RX ADMIN — LABETALOL HYDROCHLORIDE 5 MG: 5 INJECTION, SOLUTION INTRAVENOUS at 08:49

## 2019-08-30 RX ADMIN — PROPOFOL 25 MCG/KG/MIN: 10 INJECTION, EMULSION INTRAVENOUS at 08:05

## 2019-08-30 RX ADMIN — PROMETHAZINE HYDROCHLORIDE 12.5 MG: 25 INJECTION INTRAMUSCULAR; INTRAVENOUS at 08:03

## 2019-08-30 RX ADMIN — Medication 3 G: at 07:56

## 2019-08-30 RX ADMIN — SCOPALAMINE 1 PATCH: 1 PATCH, EXTENDED RELEASE TRANSDERMAL at 07:34

## 2019-08-30 RX ADMIN — ROCURONIUM BROMIDE 50 MG: 10 INJECTION INTRAVENOUS at 08:00

## 2019-08-30 RX ADMIN — METOPROLOL TARTRATE 2.5 MG: 5 INJECTION, SOLUTION INTRAVENOUS at 08:32

## 2019-08-30 RX ADMIN — FENTANYL CITRATE 50 MCG: 50 INJECTION INTRAMUSCULAR; INTRAVENOUS at 10:20

## 2019-08-30 RX ADMIN — LIDOCAINE HYDROCHLORIDE 50 MG: 10 INJECTION, SOLUTION EPIDURAL; INFILTRATION; INTRACAUDAL; PERINEURAL at 08:00

## 2019-09-01 ENCOUNTER — NURSE TRIAGE (OUTPATIENT)
Dept: CALL CENTER | Facility: HOSPITAL | Age: 59
End: 2019-09-01

## 2019-09-01 NOTE — TELEPHONE ENCOUNTER
"Caller is calling about her pain medication, only received 10 Roxicodone, has following instructions for break thru pain and wanting to know when she needs to resume her Asprin. Will be calling her surgeon    Reason for Disposition  • Caller has URGENT medication question about med that PCP prescribed and triager unable to answer question    Additional Information  • Negative: Drug overdose and nurse unable to answer question  • Negative: Caller requesting information not related to medicine  • Negative: Caller requesting a prescription for Strep throat and has a positive culture result  • Negative: Rash while taking a medication or within 3 days of stopping it  • Negative: Immunization reaction suspected  • Negative: [1] Asthma and [2] having symptoms of asthma (cough, wheezing, etc)  • Negative: MORE THAN A DOUBLE DOSE of a prescription or over-the-counter (OTC) drug  • Negative: [1] DOUBLE DOSE (an extra dose or lesser amount) of over-the-counter (OTC) drug AND [2] any symptoms (e.g., dizziness, nausea, pain, sleepiness)  • Negative: [1] DOUBLE DOSE (an extra dose or lesser amount) of prescription drug AND [2] any symptoms (e.g., dizziness, nausea, pain, sleepiness)  • Negative: Took another person's prescription drug  • Negative: [1] DOUBLE DOSE (an extra dose or lesser amount) of prescription drug AND [2] NO symptoms (Exception: a double dose of antibiotics)  • Negative: Diabetes drug error or overdose (e.g., insulin or extra dose)  • Negative: [1] Request for URGENT new prescription or refill of \"essential\" medication (i.e., likelihood of harm to patient if not taken) AND [2] triager unable to fill per unit policy  • Negative: [1] Prescription not at pharmacy AND [2] was prescribed today by PCP  • Negative: Pharmacy calling with prescription questions and triager unable to answer question    Answer Assessment - Initial Assessment Questions  1. SYMPTOMS: \"Do you have any symptoms?\"   only got 10 pain pills of " "the Roxicodone  2. SEVERITY: If symptoms are present, ask \"Are they mild, moderate or severe?\"      Moderate to severe    Protocols used: MEDICATION QUESTION CALL-ADULT-      "

## 2019-09-05 ENCOUNTER — TELEPHONE (OUTPATIENT)
Dept: GYNECOLOGIC ONCOLOGY | Facility: CLINIC | Age: 59
End: 2019-09-05

## 2019-09-05 DIAGNOSIS — C54.1 ENDOMETRIAL CANCER (HCC): Primary | ICD-10-CM

## 2019-09-05 NOTE — TELEPHONE ENCOUNTER
I called patient to discuss final pathology which showed greater than 60% myometrial invasion.  There are other prognostic factors we did not discuss in detail such as lymphovascular space invasion.  I recommended CT scan as a next step that will be performed at the day for follow-up visit.  She and her daughter both discussed these findings with me and understand that radiation is likely to be a part of her postoperative care.

## 2019-09-09 LAB
CYTO UR: NORMAL
LAB AP CASE REPORT: NORMAL
LAB AP CLINICAL INFORMATION: NORMAL
LAB AP DIAGNOSIS COMMENT: NORMAL
LAB AP INTEGRATED ONCOLOGY, ADDENDUM: NORMAL
Lab: NORMAL
PATH REPORT.FINAL DX SPEC: NORMAL
PATH REPORT.GROSS SPEC: NORMAL

## 2019-09-19 ENCOUNTER — HOSPITAL ENCOUNTER (OUTPATIENT)
Dept: CT IMAGING | Facility: HOSPITAL | Age: 59
Discharge: HOME | End: 2019-09-19

## 2019-09-19 ENCOUNTER — APPOINTMENT (OUTPATIENT)
Dept: CT IMAGING | Facility: HOSPITAL | Age: 59
End: 2019-09-19

## 2019-09-19 ENCOUNTER — OFFICE VISIT (OUTPATIENT)
Dept: GYNECOLOGIC ONCOLOGY | Facility: CLINIC | Age: 59
End: 2019-09-19

## 2019-09-19 ENCOUNTER — HOSPITAL ENCOUNTER (OUTPATIENT)
Dept: CT IMAGING | Facility: HOSPITAL | Age: 59
Discharge: HOME | End: 2019-09-19
Admitting: OBSTETRICS & GYNECOLOGY

## 2019-09-19 VITALS
HEART RATE: 76 BPM | TEMPERATURE: 97.6 F | SYSTOLIC BLOOD PRESSURE: 159 MMHG | BODY MASS INDEX: 32.45 KG/M2 | WEIGHT: 195 LBS | RESPIRATION RATE: 16 BRPM | DIASTOLIC BLOOD PRESSURE: 81 MMHG | OXYGEN SATURATION: 98 %

## 2019-09-19 DIAGNOSIS — C54.1 ENDOMETRIAL CANCER (HCC): Primary | ICD-10-CM

## 2019-09-19 DIAGNOSIS — C54.1 ENDOMETRIAL CANCER (HCC): ICD-10-CM

## 2019-09-19 PROCEDURE — 99213 OFFICE O/P EST LOW 20 MIN: CPT | Performed by: OBSTETRICS & GYNECOLOGY

## 2019-09-19 PROCEDURE — 74176 CT ABD & PELVIS W/O CONTRAST: CPT

## 2019-09-19 NOTE — PROGRESS NOTES
Paty Marie  [id number]  1960      Reason for Visit:  Treatment planning for newly diagnosed endometrial cancer, Postoperative evaluation    History of Present Illness:  Patient is a very pleasant 59 y.o. woman who presents for a post operative evaluation status post TOTAL LAPAROSCOPIC HYSTERECTOMY BILATERAL SALPINGOOPHORECTOMY WITH DAVINCI ROBOT, BIOPSIES OF LEFT VULVAR SKIN LESIONS performed on 8/30/2019.      Surgery and hospital course were uncomplicated.  Today, patient notes normal bowel and bladder function.  Her pain is well controlled. She has questions about resuming normal activities.     Past Medical History, Past Surgical History, Social History, Family History have been reviewed and are without significant changes except as mentioned.    Review of Systems   All other systems were reviewed and are negative except as mentioned above.    Medications:  The current medication list was reviewed in the EMR    ALLERGIES:    Allergies   Allergen Reactions   • Erythromycin Nausea Only   • Zofran [Ondansetron Hcl] Other (See Comments)     Pt has never taken but does does not want due to her daughter's experience with it.           /81   Pulse 76   Temp 97.6 °F (36.4 °C) (Temporal)   Resp 16   Wt 88.5 kg (195 lb)   SpO2 98%   BMI 32.45 kg/m²        Physical Exam  Constitutional:  Patient is a pleasant woman in no acute distress.  Gastrointestinal: Abdomen is soft and appropriately tender.  There is no mass palpated.  There is no rebound or guarding.  Incision(s) is clean, dry and intact.  Extremities:  Bilateral lower extremities are non-tender.  Gynecologic:GYNECOLOGIC:  External genitalia are free from lesion. On speculum examination, the vaginal cuff was intact and no lesions were appreciated.  On bimanual examination, no fullness was appreciated.  Uterus, cervix and adnexa were absent.  There was no significant tenderness.  Rectovaginal exam was deferred.      PATHOLOGY:  Final Diagnosis   1.  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   Lymphovascular invasion present.  No involvement of cervical stroma, fallopian tubes, ovaries or parametrium.   See comment  2. LEFT VULVA SKIN LESION:  Benign fibroepithelial polyps.         UTERUS ENDOMETRIUM TEMPLATE:  SPECIMEN TYPE/ PROCEDURE:  Hysterectomy and bilateral salpingo-oophorectomy   LYMPH NODE SAMPLING:  Not performed   SPECIMEN INTEGRITY:   Intact   TUMOR SIZE (greatest dimension):  4.0 x 3.0 x 1.0 cm  HISTOLOGIC TYPE:  Endometrioid adenocarcinoma   HISTOLOGIC GRADE:  FIGO grade 1  MYOMETRIAL INVASION (Present/Not identified):  Present                DEPTH OF INVASION (mm): 11 mm               MYOMETRIAL THICKNESS (mm): 18 mm               PERCENTAGE OF MYOMETRIAL INVASION: 61%  INVOLVEMENT OF CERVICAL STROMA:  Not identified   EXTENT OF INVOLVEMENT OF OTHER TISSUE/ORGANS:  Not identified   UTERINE SEROSA INVOLVEMENT: Not identified   MARGINS:  Negative   PELVIC LYMPH NODES (SUBMITTED/NONE): None  PARA-AORTIC LYMPH NODES (SUBMITTED/NONE): None  LYMPHVASCULAR INVASION: Present  MSI TESTING (under age 60):   Results to be issued as an Addendum  ADDITIONAL PATHOLOGIC FINDINGS:  None   ANCILLARY STUDIES:  None  AJCC PATHOLOGIC STAGE:  (COMPLETED BY PATHOLOGIST, BASED ONLY ON TISSUE FINDINGS, MORE EXTENSIVE DISEASE MAY NOT BE KNOWN TO THE PATHOLOGIST)  pT=  1b  pN=    X  AJCC PATHOLOGIC STAGE:  IB         ASSESSMENT/PLAN:  Paty Marie returns for a post-operative evaluation today.  All pathology reports were given to patient.  Unfortunately, frozen section showed a grade 1 endometrioid adenocarcinoma with 30% myometrial invasion and the final report is more concerning with greater than 50% myometrial invasion and  lymphovascular space invasion.  Per NCCN guideline recommendations, patient would be a candidate for lymph node dissection as she is physically fit.  CT scan was ordered to help with patient's treatment planning.  I received phone call just now from the CT scan the patient is refusing IV contrast because it makes her warm all over.  This is not listed as an allergy.  CT scan order was changed.  Patient is willing to take oral contrast.  I am going to call her to discuss the results.  I had a detailed conversation with the patient and her family.  The patient's family I do not think had a good understanding about the indication for surgery to begin with and did not realize that she had endometrial cancer.      Patient was consented for robotic assisted cancer staging with lymph node dissection.      Risks and benefits of surgery were discussed.  This included, but was not limited to, infection and bleeding like when the skin is cut; damage to surrounding structures; and incisional complications.  Risk of DVT was addressed for major surgeries.  Standard of care efforts to minimize these risks were reviewed.  Typical hospital stay and recovery were discussed as well as post-procedure precautions.  Surgical implications of chronic illnesses on recovery and surgical outcome were reviewed.     Pain medication regimen for postoperative care was discussed.  Typical regimen and avoidance of narcotics was discussed.  Patient was educated that other factors, such as existing narcotic use, can impact postoperative pain management.      Risks and benefits of lymph node dissection were further discussed.  This included lymphocyst, hematoma, lymphedema, vascular injury, and nerve injury.    Patient verbalized understanding of the plan including the risks and benefits.  Appropriate perioperative testing including laboratory evaluation, EKG as clinically indicated, chest x-ray as clinically indicated, and preadmission evaluation  were all ordered as a part of this patient's care.    I personally spent 15 minutes face-to-face with the patient of which >50% was spent performing counseling/coordiantion of cancer care.    Jennifer Holman MD  09/19/19  4:42 PM        Jennifer Holman MD

## 2019-09-20 ENCOUNTER — TELEPHONE (OUTPATIENT)
Dept: GYNECOLOGIC ONCOLOGY | Facility: CLINIC | Age: 59
End: 2019-09-20

## 2019-09-25 ENCOUNTER — TELEPHONE (OUTPATIENT)
Dept: GYNECOLOGIC ONCOLOGY | Facility: CLINIC | Age: 59
End: 2019-09-25

## 2019-09-25 NOTE — TELEPHONE ENCOUNTER
I called pt to discuss CT results - negative for metastatic disease.  By guideline recommendations, patient would still be candidate for lymph node dissection given high risk findings on uterine pathology.  She verbalized understanding.  Kathya, surgery scheduler, is to call patient in 2 days time to formalize a date for robotic assisted lymph node dissection/staging.  All questions were answered.

## 2019-09-26 ENCOUNTER — APPOINTMENT (OUTPATIENT)
Dept: CT IMAGING | Facility: HOSPITAL | Age: 59
End: 2019-09-26

## 2019-10-01 ENCOUNTER — PATIENT EDUCATION (SURGERY INSTRUCTIONS) (OUTPATIENT)
Dept: GYNECOLOGIC ONCOLOGY | Facility: CLINIC | Age: 59
End: 2019-10-01

## 2019-10-01 ENCOUNTER — PREP FOR SURGERY (OUTPATIENT)
Dept: GYNECOLOGIC ONCOLOGY | Facility: CLINIC | Age: 59
End: 2019-10-01

## 2019-10-01 DIAGNOSIS — C54.1 ENDOMETRIAL CANCER (HCC): Primary | ICD-10-CM

## 2019-10-01 RX ORDER — SODIUM CHLORIDE, SODIUM LACTATE, POTASSIUM CHLORIDE, CALCIUM CHLORIDE 600; 310; 30; 20 MG/100ML; MG/100ML; MG/100ML; MG/100ML
100 INJECTION, SOLUTION INTRAVENOUS CONTINUOUS
Status: CANCELLED | OUTPATIENT
Start: 2019-10-01

## 2019-10-01 RX ORDER — PREGABALIN 25 MG/1
150 CAPSULE ORAL ONCE
Status: CANCELLED | OUTPATIENT
Start: 2019-10-01 | End: 2019-10-01

## 2019-10-01 RX ORDER — ACETAMINOPHEN 325 MG/1
650 TABLET ORAL ONCE
Status: CANCELLED | OUTPATIENT
Start: 2019-10-01

## 2019-10-01 RX ORDER — CELECOXIB 100 MG/1
200 CAPSULE ORAL ONCE
Status: CANCELLED | OUTPATIENT
Start: 2019-10-01

## 2019-10-01 NOTE — PATIENT INSTRUCTIONS
Gynecologic Oncology  Inpatient Pre-op Patient Education  *See checked boxes for your instructions*    Patient Name:  Paty Marie  [id number]  1960    Surgeon:  Dr. Holman    Appointment  [x]  1. Your surgery has been scheduled on 11-22-19. You will need to be at the second floor surgery registration of the [facility] hospital on that day at 9:00am.   [x] The day of surgery,  Enter the campus grounds through entrance #2, park in [facility], walk up the hill into the hospital and follow that holder until you see elevators on right, use that elevator to go to the 2nd floor, when the door opens, you will see an arrow to direct you to registration   [x] On 11-21-19, the day prior to surgery, eat lightly.      The Day(s) Before Surgery  [x] 1.  No solid food after midnight on 11-21-19.  You may have sips of clear fluids up until six hours prior to your arrival to the hospital on the morning of surgery. (Milk, cream and orange juice are not clear liquids).   [x] Please remove fingernail polish, you may leave toenail polish on.   [x] 3.  Do not take vitamins or full dose aspirin one week before surgery.  If you normally take a blood thinning medication such as Warfarin, Eliquis, or Xarelto, we will give you specific instructions regarding these medications and we may need to talk with your other doctors.   [x] 4.  On the morning of your surgery, you may likely take your routine prescription medications with a sip of water as reviewed with you by your surgeon.  Bring your home medications with you to the hospital as we may need to reference these.  In particular be sure to bring any inhalers.     Post-surgery Instructions  [x] 1.  The length of stay for your type of surgery is typically one hospital night, however it is also possible that you could be discharged home in the evening on the same day depending on the nature of your surgery.  All rooms are private, so family member may stay with you.     [x] 2.  Do not take  your own home prescription medication while you are in the hospital unless otherwise instructed.  These will be provided to you.         Comments:

## 2019-10-23 ENCOUNTER — OFFICE VISIT (OUTPATIENT)
Dept: GYNECOLOGIC ONCOLOGY | Facility: CLINIC | Age: 59
End: 2019-10-23

## 2019-10-23 VITALS
OXYGEN SATURATION: 96 % | HEART RATE: 61 BPM | RESPIRATION RATE: 12 BRPM | WEIGHT: 198 LBS | DIASTOLIC BLOOD PRESSURE: 76 MMHG | BODY MASS INDEX: 32.95 KG/M2 | TEMPERATURE: 97.9 F | SYSTOLIC BLOOD PRESSURE: 157 MMHG

## 2019-10-23 DIAGNOSIS — C54.1 ENDOMETRIAL CANCER (HCC): Primary | ICD-10-CM

## 2019-10-23 PROCEDURE — 99024 POSTOP FOLLOW-UP VISIT: CPT | Performed by: OBSTETRICS & GYNECOLOGY

## 2019-10-23 RX ORDER — HYDROCODONE BITARTRATE AND ACETAMINOPHEN 10; 325 MG/1; MG/1
TABLET ORAL
Refills: 0 | COMMUNITY
Start: 2019-10-01

## 2019-10-23 RX ORDER — METHOCARBAMOL 500 MG/1
TABLET, FILM COATED ORAL
Refills: 5 | COMMUNITY
Start: 2019-09-24

## 2019-10-23 RX ORDER — TRAZODONE HYDROCHLORIDE 50 MG/1
TABLET ORAL
Refills: 1 | COMMUNITY
Start: 2019-08-14

## 2019-10-23 RX ORDER — HYDROCHLOROTHIAZIDE 12.5 MG/1
CAPSULE, GELATIN COATED ORAL EVERY MORNING
Refills: 2 | COMMUNITY
Start: 2019-09-16

## 2019-10-23 RX ORDER — FUROSEMIDE 20 MG/1
20 TABLET ORAL AS NEEDED
COMMUNITY
Start: 2014-09-30 | End: 2020-05-14

## 2019-10-23 RX ORDER — PROMETHAZINE HYDROCHLORIDE 25 MG/1
TABLET ORAL
Refills: 0 | COMMUNITY
Start: 2019-07-24 | End: 2020-05-14

## 2019-10-23 RX ORDER — PANTOPRAZOLE SODIUM 40 MG/1
40 TABLET, DELAYED RELEASE ORAL DAILY
Refills: 2 | COMMUNITY
Start: 2019-09-16

## 2019-10-23 NOTE — PROGRESS NOTES
Paty Marie  [id number]  1960      Reason for visit:  Preoperative evaluation for lymph node staging in the setting of endometrial cancer    History of present illness:  The patient is a 59 y.o. year old female who presents today for treatment and evaluation of the above issues.    Patient recently underwent robot-assisted total laparoscopic hysterectomy, bilateral salpingo-oophorectomy, and biopsies of left vulvar skin lesions on 19. Her post-operative course was uncomplicated, however final pathology resulted with more locally advanced cancer invasion than indicated by the frozen section. She presents today for evaluation for robot-assisted lymph node dissection and staging.      She has no interim complaints.    OBGYN History:  She is a .  She went through menopause at 51yo and does not use HRT. She does not have a history of abnormal pap smears.      Oncologic History:     Endometrial cancer (CMS/HCC)    2019 Surgery     Surgery       Procedure:    TOTAL LAPAROSCOPIC HYSTERECTOMY BILATERAL SALPINGOOPHORECTOMY WITH DAVINCI ROBOT, BIOPSIES OF LEFT VULVAR SKIN LESIONS             Final Diagnosis   1. 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   Lymphovascular invasion present.  No involvement of cervical stroma, fallopian tubes, ovaries or parametrium.   See comment  2. LEFT VULVA SKIN LESION:  Benign fibroepithelial polyps              2019 Initial Diagnosis     Endometrial cancer (CMS/HCC)              Past Medical History:   Diagnosis Date   • Arthritis    • Blood clotting disorder (CMS/HCC)    • Diabetes (CMS/HCC)    • Diastolic dysfunction    • Hypertension    • Lupus anticoagulant disorder (CMS/HCC)    • PONV (postoperative nausea and vomiting)         Past Surgical History:   Procedure Laterality Date   • LAPAROSCOPIC TUBAL LIGATION     • NECK SURGERY     • SKIN CANCER EXCISION     • TOTAL LAPAROSCOPIC HYSTERECTOMY SALPINGO OOPHORECTOMY Bilateral 2019    Procedure: TOTAL LAPAROSCOPIC HYSTERECTOMY BILATERAL SALPINGOOPHORECTOMY WITH DAVINCI ROBOT, BIOPSIES OF LEFT VULVAR SKIN LESIONS;  Surgeon: Jennifer Holman MD;  [facility];  [facility]       MEDICATIONS: The current medication list was reviewed with the patient and updated in the EMR this date per the Medical Assistant. Medication dosages and frequencies were confirmed to be accurate.      Allergies:  is allergic to erythromycin and zofran [ondansetron hcl].    Social History:   Social History     Socioeconomic History   • Marital status:      Spouse name: Not on file   • Number of children: 2   • Years of education: Not on file   • Highest education level: Not on file   Tobacco Use   • Smoking status: Former Smoker     Types: Electronic Cigarette     Last attempt to quit: 2018     Years since quittin.8   • Smokeless tobacco: Never Used   • Tobacco comment: vapes ecig   Substance and Sexual Activity   • Alcohol use: No     Frequency: Never   • Drug use: No   • Sexual activity: Defer       Family History:  History reviewed. No pertinent family history.    Health Maintenance:    Health Maintenance   Topic Date Due   • MAMMOGRAM  1960   • ANNUAL PHYSICAL  1965   • PNEUMOCOCCAL VACCINE (19-64 MEDIUM RISK) (1 of 1 - PPSV23) 1981   • TDAP/TD VACCINES (1 - Tdap) 1981   • ZOSTER VACCINE (1 of 2) 2012   • HEPATITIS C SCREENING  2019   • COLONOSCOPY  2019   • INFLUENZA VACCINE  2019   • PAP SMEAR  2022         Review of Systems   Constitutional: Negative for activity change, appetite change, chills, fatigue, fever and unexpected weight change.   HENT: Negative for congestion, hearing loss, sinus pressure, sinus pain,  sneezing and sore throat.    Eyes: Negative for discharge, redness, itching and visual disturbance.   Respiratory: Negative for cough, shortness of breath and wheezing.    Cardiovascular: Negative for chest pain, palpitations and leg swelling.   Gastrointestinal: Negative for abdominal distention, abdominal pain, blood in stool, constipation, diarrhea, nausea and vomiting.   Endocrine: Negative.  Negative for cold intolerance and heat intolerance.   Genitourinary: Negative for difficulty urinating, dyspareunia, dysuria, frequency, genital sores, hematuria, pelvic pain, urgency, vaginal bleeding, vaginal discharge and vaginal pain.   Musculoskeletal: Positive for back pain and neck pain. Negative for arthralgias, gait problem and joint swelling.   Skin: Negative for color change, rash and wound.   Allergic/Immunologic: Negative for environmental allergies, food allergies and immunocompromised state.   Neurological: Negative for dizziness, seizures, syncope, weakness, light-headedness, numbness and headaches.   Hematological: Negative for adenopathy. Does not bruise/bleed easily.   Psychiatric/Behavioral: Negative for agitation, confusion, decreased concentration, dysphoric mood, hallucinations and sleep disturbance. The patient is nervous/anxious.        Physical Exam    Vitals:    10/23/19 1433   BP: 157/76   Pulse: 61   Resp: 12   Temp: 97.9 °F (36.6 °C)   TempSrc: Temporal   SpO2: 96%   Weight: 89.8 kg (198 lb)   PainSc:   4   PainLoc: Back     Body mass index is 32.95 kg/m².    Wt Readings from Last 3 Encounters:   10/23/19 89.8 kg (198 lb)   09/19/19 88.5 kg (195 lb)   08/30/19 89.4 kg (197 lb)         GENERAL: Alert, well-appearing female appearing her stated age who is in no apparent distress.   HEENT: Sclera anicteric. Head normocephalic, atraumatic. Mucus membranes moist.   NECK: Deferred  BREASTS: Deferred  CARDIOVASCULAR: Deferred  RESPIRATORY: Clear to auscultation bilaterally, normal respiratory  effort  BACK:  Deferred  GASTROINTESTINAL:  Deferred  SKIN:  Warm, dry, well-perfused.  All visible areas intact.  No rashes, lesions, ulcers.  PSYCHIATRIC: AO x3, with appropriate affect, normal thought processes.  NEUROLOGIC: No focal deficits.  Moves extremities well.  MUSCULOSKELETAL: Normal gait and station.   EXTREMITIES:   No cyanosis, clubbing, symmetric.  LYMPHATICS:  Deferred     PELVIC exam:    Deferred    ECOG PS 0    PROCEDURES:  none    Diagnostic Data:      Ct Abdomen Pelvis Without Contrast    Result Date: 9/20/2019  Postsurgical changes seen from hysterectomy. There is no evidence of metastatic or recurrent disease. No acute intra-abdominal or  pelvic abnormality identified.  DICTATED:   09/19/2019 EDITED/ls :   09/19/2019   This report was finalized on 9/20/2019 4:29 PM by Dr. Ladi Mckeon MD.      Xr Chest 1 View    Result Date: 8/30/2019  Impression: No acute cardiopulmonary pathology. Signer Name: Boubacar Roman MD  Signed: 8/30/2019 7:56 AM  Workstation Name: [id number]  [facility]      Lab Results   Component Value Date    WBC 8.68 08/30/2019    HGB 12.4 08/30/2019    HCT 40.0 08/30/2019    MCV 91.1 08/30/2019     08/30/2019    NEUTROABS 4.04 08/30/2019    GLUCOSE 136 (H) 08/30/2019    BUN 11 08/30/2019    CREATININE 0.83 08/30/2019    EGFRIFNONA 71 08/30/2019    EGFRIFAFRI  09/08/2016      Comment:      <15 Indicative of kidney failure.     08/30/2019    K 4.0 08/30/2019     08/30/2019    CO2 26.0 08/30/2019    CALCIUM 9.2 08/30/2019    ALBUMIN 4.10 08/30/2019    AST 18 08/30/2019    ALT 15 08/30/2019    BILITOT 0.2 08/30/2019     No results found for:         Assessment/Plan   This is a 59 y.o. woman with Stage IB endometrial cancer.     Her CT scan was negative for metastatic disease, however due to the high risk findings on her pathology, she now meets criteria for lymph node dissection and staging. This was discussed with the patient  and she is agreeable to further surgical management.      Patient was again consented for robot-assisted lymph node dissection and staging.      Risks and benefits of surgery were discussed.  This included, but was not limited to, infection and bleeding like when the skin is cut; damage to surrounding structures; and incisional complications.  Risk of DVT was addressed for major surgeries.  Standard of care efforts to minimize these risks were reviewed.  Typical hospital stay and recovery were discussed as well as post-procedure precautions.  Surgical implications of chronic illnesses on recovery and surgical outcome were reviewed.     Pain medication regimen for postoperative care was discussed.  Typical regimen and avoidance of narcotics was discussed.  Patient was educated that other factors, such as existing narcotic use, can impact postoperative pain management.      Risks and benefits of lymph node dissection were further discussed.  This included lymphocyst, hematoma, lymphedema, vascular injury, and nerve injury.    Patient verbalized understanding of the plan including the risks and benefits.  Appropriate perioperative testing including laboratory evaluation, EKG as clinically indicated, chest x-ray as clinically indicated, and preadmission evaluation were all ordered as a part of this patient's care.    Patient declined earlier surgery date and we presents today to review plan.  She complains she was not given enough narcotic medicine at her last surgery and we discussed oxycodone 5 mg x 20 for the surgery.  She complains that she will be very nervous the day of her surgery and is worried about her blood pressure.  She is reassured.  She is very concerned about risk of lymphedema postoperatively and this was discussed in detail.  She did have post surgery peripheral edema with her hysterectomy and we discussed the difference between transient postoperative extremity swelling and true lymphedema.    We  again reviewed that vaginal brachytherapy would be indicated for stage Ib endometrial cancer that is completely staged.  We again discussed that chemotherapy may be indicated should lymph node metastasis be identified.  She verbalized understanding.  She is accompanied by her daughter but not by her son as he was unavailable for visit today.    Pain assessment was performed today as a part of patient’s care.  For patients with pain related to surgery, gynecologic malignancy or cancer treatment, the plan is as noted in the assessment/plan.  For patients with pain not related to these issues, they are to seek any further needed care from a more appropriate provider, such as PCP.    No orders of the defined types were placed in this encounter.    FOLLOW UP: surgery; preop visit    Electronically Signed by: Jennifer Holman MD  Date: 10/23/2019

## 2019-10-30 ENCOUNTER — TELEPHONE (OUTPATIENT)
Dept: GYNECOLOGIC ONCOLOGY | Facility: CLINIC | Age: 59
End: 2019-10-30

## 2019-10-30 NOTE — TELEPHONE ENCOUNTER
Received VM from patient statin that she she was scheduled for surgery on Friday and she's pretty sure she isn't going to be able to have it. Would like for someone to give her a call back.

## 2019-10-30 NOTE — TELEPHONE ENCOUNTER
----- Message from Lorna Capone RN sent at 10/30/2019  4:12 PM EDT -----  Regarding: Call pt back   Pt left vm on my phone that she has bronchitis and wants you to call her back.   Returned pt's call, states she has bronchitis and was given antibiotics, new date set for 11-22-19, new information mailed, pt to arrive early day of for signing of permits, b/w, etc., she will call before if she does not feel like she is improving.

## 2019-10-30 NOTE — TELEPHONE ENCOUNTER
Returned pt's call, states she is very congested, coughing up yellow-tinged drainage, no fever, it started Friday, she is her PCP office now and says they are checking her for the flu, I informed her we would need to reschedule her surgery for a day further out, call me after she is seen by her PCP with update, pt v/u, will do as instructed.

## 2019-11-22 ENCOUNTER — TELEPHONE (OUTPATIENT)
Dept: GYNECOLOGIC ONCOLOGY | Facility: CLINIC | Age: 59
End: 2019-11-22

## 2019-11-22 ENCOUNTER — ANESTHESIA (OUTPATIENT)
Dept: PERIOP | Facility: HOSPITAL | Age: 59
End: 2019-11-22

## 2019-11-22 ENCOUNTER — ANESTHESIA EVENT (OUTPATIENT)
Dept: PERIOP | Facility: HOSPITAL | Age: 59
End: 2019-11-22

## 2019-11-22 ENCOUNTER — HOSPITAL ENCOUNTER (OUTPATIENT)
Facility: HOSPITAL | Age: 59
Setting detail: SURGERY ADMIT
Discharge: HOME | End: 2019-11-22
Attending: OBSTETRICS & GYNECOLOGY | Admitting: OBSTETRICS & GYNECOLOGY

## 2019-11-22 VITALS
SYSTOLIC BLOOD PRESSURE: 128 MMHG | DIASTOLIC BLOOD PRESSURE: 81 MMHG | TEMPERATURE: 98.1 F | BODY MASS INDEX: 32.99 KG/M2 | RESPIRATION RATE: 18 BRPM | WEIGHT: 198 LBS | HEART RATE: 58 BPM | HEIGHT: 65 IN | OXYGEN SATURATION: 98 %

## 2019-11-22 DIAGNOSIS — C54.1 ENDOMETRIAL CANCER (HCC): ICD-10-CM

## 2019-11-22 LAB
ABO GROUP BLD: NORMAL
ALBUMIN SERPL-MCNC: 4.1 G/DL (ref 3.5–5.2)
ALBUMIN/GLOB SERPL: 1.5 G/DL
ALP SERPL-CCNC: 65 U/L (ref 39–117)
ALT SERPL W P-5'-P-CCNC: 18 U/L (ref 1–33)
ANION GAP SERPL CALCULATED.3IONS-SCNC: 12 MMOL/L (ref 5–15)
AST SERPL-CCNC: 16 U/L (ref 1–32)
BASOPHILS # BLD AUTO: 0.05 10*3/MM3 (ref 0–0.2)
BASOPHILS NFR BLD AUTO: 0.8 % (ref 0–1.5)
BILIRUB SERPL-MCNC: 0.5 MG/DL (ref 0.2–1.2)
BLD GP AB SCN SERPL QL: NEGATIVE
BUN BLD-MCNC: 7 MG/DL (ref 6–20)
BUN/CREAT SERPL: 8.8 (ref 7–25)
CALCIUM SPEC-SCNC: 9.2 MG/DL (ref 8.6–10.5)
CHLORIDE SERPL-SCNC: 105 MMOL/L (ref 98–107)
CO2 SERPL-SCNC: 27 MMOL/L (ref 22–29)
CREAT BLD-MCNC: 0.8 MG/DL (ref 0.57–1)
DEPRECATED RDW RBC AUTO: 44.8 FL (ref 37–54)
EOSINOPHIL # BLD AUTO: 0.33 10*3/MM3 (ref 0–0.4)
EOSINOPHIL NFR BLD AUTO: 5.5 % (ref 0.3–6.2)
ERYTHROCYTE [DISTWIDTH] IN BLOOD BY AUTOMATED COUNT: 13.4 % (ref 12.3–15.4)
GFR SERPL CREATININE-BSD FRML MDRD: 74 ML/MIN/1.73
GLOBULIN UR ELPH-MCNC: 2.8 GM/DL
GLUCOSE BLD-MCNC: 135 MG/DL (ref 65–99)
GLUCOSE BLDC GLUCOMTR-MCNC: 123 MG/DL (ref 70–130)
GLUCOSE BLDC GLUCOMTR-MCNC: 140 MG/DL (ref 70–130)
HCT VFR BLD AUTO: 39.7 % (ref 34–46.6)
HGB BLD-MCNC: 12.6 G/DL (ref 12–15.9)
IMM GRANULOCYTES # BLD AUTO: 0.01 10*3/MM3 (ref 0–0.05)
IMM GRANULOCYTES NFR BLD AUTO: 0.2 % (ref 0–0.5)
LYMPHOCYTES # BLD AUTO: 1.64 10*3/MM3 (ref 0.7–3.1)
LYMPHOCYTES NFR BLD AUTO: 27.3 % (ref 19.6–45.3)
MCH RBC QN AUTO: 29 PG (ref 26.6–33)
MCHC RBC AUTO-ENTMCNC: 31.7 G/DL (ref 31.5–35.7)
MCV RBC AUTO: 91.3 FL (ref 79–97)
MONOCYTES # BLD AUTO: 0.45 10*3/MM3 (ref 0.1–0.9)
MONOCYTES NFR BLD AUTO: 7.5 % (ref 5–12)
NEUTROPHILS # BLD AUTO: 3.53 10*3/MM3 (ref 1.7–7)
NEUTROPHILS NFR BLD AUTO: 58.7 % (ref 42.7–76)
NRBC BLD AUTO-RTO: 0 /100 WBC (ref 0–0.2)
PLATELET # BLD AUTO: 351 10*3/MM3 (ref 140–450)
PMV BLD AUTO: 10.8 FL (ref 6–12)
POTASSIUM BLD-SCNC: 3.7 MMOL/L (ref 3.5–5.2)
PROT SERPL-MCNC: 6.9 G/DL (ref 6–8.5)
RBC # BLD AUTO: 4.35 10*6/MM3 (ref 3.77–5.28)
RH BLD: POSITIVE
SODIUM BLD-SCNC: 144 MMOL/L (ref 136–145)
T&S EXPIRATION DATE: NORMAL
WBC NRBC COR # BLD: 6.01 10*3/MM3 (ref 3.4–10.8)

## 2019-11-22 PROCEDURE — 85025 COMPLETE CBC W/AUTO DIFF WBC: CPT | Performed by: OBSTETRICS & GYNECOLOGY

## 2019-11-22 PROCEDURE — 88305 TISSUE EXAM BY PATHOLOGIST: CPT | Performed by: OBSTETRICS & GYNECOLOGY

## 2019-11-22 PROCEDURE — 86901 BLOOD TYPING SEROLOGIC RH(D): CPT | Performed by: OBSTETRICS & GYNECOLOGY

## 2019-11-22 PROCEDURE — 86900 BLOOD TYPING SEROLOGIC ABO: CPT | Performed by: OBSTETRICS & GYNECOLOGY

## 2019-11-22 PROCEDURE — 80053 COMPREHEN METABOLIC PANEL: CPT | Performed by: OBSTETRICS & GYNECOLOGY

## 2019-11-22 PROCEDURE — 25010000003 CEFAZOLIN IN DEXTROSE 2-4 GM/100ML-% SOLUTION: Performed by: OBSTETRICS & GYNECOLOGY

## 2019-11-22 PROCEDURE — 82962 GLUCOSE BLOOD TEST: CPT

## 2019-11-22 PROCEDURE — 25010000002 DEXAMETHASONE PER 1 MG: Performed by: NURSE ANESTHETIST, CERTIFIED REGISTERED

## 2019-11-22 PROCEDURE — 25010000002 FENTANYL CITRATE (PF) 100 MCG/2ML SOLUTION: Performed by: NURSE ANESTHETIST, CERTIFIED REGISTERED

## 2019-11-22 PROCEDURE — 38572 LAPAROSCOPY LYMPHADENECTOMY: CPT | Performed by: OBSTETRICS & GYNECOLOGY

## 2019-11-22 PROCEDURE — 25010000002 PROPOFOL 10 MG/ML EMULSION: Performed by: NURSE ANESTHETIST, CERTIFIED REGISTERED

## 2019-11-22 PROCEDURE — 25010000002 HYDROMORPHONE PER 4 MG: Performed by: NURSE ANESTHETIST, CERTIFIED REGISTERED

## 2019-11-22 PROCEDURE — 86850 RBC ANTIBODY SCREEN: CPT | Performed by: OBSTETRICS & GYNECOLOGY

## 2019-11-22 RX ORDER — ROCURONIUM BROMIDE 10 MG/ML
INJECTION, SOLUTION INTRAVENOUS AS NEEDED
Status: DISCONTINUED | OUTPATIENT
Start: 2019-11-22 | End: 2019-11-22 | Stop reason: SURG

## 2019-11-22 RX ORDER — PROPOFOL 10 MG/ML
VIAL (ML) INTRAVENOUS AS NEEDED
Status: DISCONTINUED | OUTPATIENT
Start: 2019-11-22 | End: 2019-11-22 | Stop reason: SURG

## 2019-11-22 RX ORDER — HYDROMORPHONE HYDROCHLORIDE 1 MG/ML
0.5 INJECTION, SOLUTION INTRAMUSCULAR; INTRAVENOUS; SUBCUTANEOUS
Status: DISCONTINUED | OUTPATIENT
Start: 2019-11-22 | End: 2019-11-22 | Stop reason: HOSPADM

## 2019-11-22 RX ORDER — HYDRALAZINE HYDROCHLORIDE 20 MG/ML
5 INJECTION INTRAMUSCULAR; INTRAVENOUS
Status: DISCONTINUED | OUTPATIENT
Start: 2019-11-22 | End: 2019-11-22 | Stop reason: HOSPADM

## 2019-11-22 RX ORDER — OXYCODONE HYDROCHLORIDE 5 MG/1
5 TABLET ORAL EVERY 4 HOURS PRN
Status: DISCONTINUED | OUTPATIENT
Start: 2019-11-22 | End: 2019-11-22 | Stop reason: HOSPADM

## 2019-11-22 RX ORDER — FENTANYL CITRATE 50 UG/ML
50 INJECTION, SOLUTION INTRAMUSCULAR; INTRAVENOUS
Status: DISCONTINUED | OUTPATIENT
Start: 2019-11-22 | End: 2019-11-22 | Stop reason: HOSPADM

## 2019-11-22 RX ORDER — IBUPROFEN 600 MG/1
600 TABLET ORAL EVERY 6 HOURS PRN
Status: DISCONTINUED | OUTPATIENT
Start: 2019-11-22 | End: 2019-11-22 | Stop reason: HOSPADM

## 2019-11-22 RX ORDER — MEPERIDINE HYDROCHLORIDE 25 MG/ML
12.5 INJECTION INTRAMUSCULAR; INTRAVENOUS; SUBCUTANEOUS
Status: COMPLETED | OUTPATIENT
Start: 2019-11-22 | End: 2019-11-22

## 2019-11-22 RX ORDER — BUPIVACAINE HYDROCHLORIDE AND EPINEPHRINE 5; 5 MG/ML; UG/ML
INJECTION, SOLUTION PERINEURAL AS NEEDED
Status: DISCONTINUED | OUTPATIENT
Start: 2019-11-22 | End: 2019-11-22 | Stop reason: HOSPADM

## 2019-11-22 RX ORDER — SODIUM CHLORIDE, SODIUM LACTATE, POTASSIUM CHLORIDE, CALCIUM CHLORIDE 600; 310; 30; 20 MG/100ML; MG/100ML; MG/100ML; MG/100ML
100 INJECTION, SOLUTION INTRAVENOUS CONTINUOUS
Status: DISCONTINUED | OUTPATIENT
Start: 2019-11-22 | End: 2019-11-22 | Stop reason: HOSPADM

## 2019-11-22 RX ORDER — SODIUM CHLORIDE 0.9 % (FLUSH) 0.9 %
3 SYRINGE (ML) INJECTION EVERY 12 HOURS SCHEDULED
Status: DISCONTINUED | OUTPATIENT
Start: 2019-11-22 | End: 2019-11-22

## 2019-11-22 RX ORDER — PROMETHAZINE HYDROCHLORIDE 25 MG/1
25 SUPPOSITORY RECTAL ONCE AS NEEDED
Status: DISCONTINUED | OUTPATIENT
Start: 2019-11-22 | End: 2019-11-22 | Stop reason: HOSPADM

## 2019-11-22 RX ORDER — SODIUM CHLORIDE 0.9 % (FLUSH) 0.9 %
3-10 SYRINGE (ML) INJECTION AS NEEDED
Status: DISCONTINUED | OUTPATIENT
Start: 2019-11-22 | End: 2019-11-22 | Stop reason: HOSPADM

## 2019-11-22 RX ORDER — IPRATROPIUM BROMIDE AND ALBUTEROL SULFATE 2.5; .5 MG/3ML; MG/3ML
3 SOLUTION RESPIRATORY (INHALATION) ONCE AS NEEDED
Status: DISCONTINUED | OUTPATIENT
Start: 2019-11-22 | End: 2019-11-22 | Stop reason: HOSPADM

## 2019-11-22 RX ORDER — LIDOCAINE HYDROCHLORIDE 10 MG/ML
INJECTION, SOLUTION EPIDURAL; INFILTRATION; INTRACAUDAL; PERINEURAL AS NEEDED
Status: DISCONTINUED | OUTPATIENT
Start: 2019-11-22 | End: 2019-11-22 | Stop reason: SURG

## 2019-11-22 RX ORDER — CEFAZOLIN SODIUM IN 0.9 % NACL 3 G/100 ML
3 INTRAVENOUS SOLUTION, PIGGYBACK (ML) INTRAVENOUS ONCE
Status: DISCONTINUED | OUTPATIENT
Start: 2019-11-22 | End: 2019-11-22 | Stop reason: DRUGHIGH

## 2019-11-22 RX ORDER — FAMOTIDINE 20 MG/1
20 TABLET, FILM COATED ORAL ONCE
Status: DISCONTINUED | OUTPATIENT
Start: 2019-11-22 | End: 2019-11-22

## 2019-11-22 RX ORDER — ACETAMINOPHEN 325 MG/1
650 TABLET ORAL EVERY 6 HOURS PRN
Qty: 30 TABLET | Refills: 0 | Status: SHIPPED | OUTPATIENT
Start: 2019-11-22 | End: 2020-05-14

## 2019-11-22 RX ORDER — PROPOFOL 10 MG/ML
VIAL (ML) INTRAVENOUS CONTINUOUS PRN
Status: DISCONTINUED | OUTPATIENT
Start: 2019-11-22 | End: 2019-11-22 | Stop reason: SURG

## 2019-11-22 RX ORDER — PROMETHAZINE HYDROCHLORIDE 25 MG/1
25 TABLET ORAL ONCE AS NEEDED
Status: DISCONTINUED | OUTPATIENT
Start: 2019-11-22 | End: 2019-11-22 | Stop reason: HOSPADM

## 2019-11-22 RX ORDER — FENTANYL CITRATE 50 UG/ML
INJECTION, SOLUTION INTRAMUSCULAR; INTRAVENOUS AS NEEDED
Status: DISCONTINUED | OUTPATIENT
Start: 2019-11-22 | End: 2019-11-22 | Stop reason: SURG

## 2019-11-22 RX ORDER — ACETAMINOPHEN 325 MG/1
650 TABLET ORAL EVERY 6 HOURS PRN
Status: DISCONTINUED | OUTPATIENT
Start: 2019-11-22 | End: 2019-11-22 | Stop reason: HOSPADM

## 2019-11-22 RX ORDER — LABETALOL HYDROCHLORIDE 5 MG/ML
5 INJECTION, SOLUTION INTRAVENOUS
Status: DISCONTINUED | OUTPATIENT
Start: 2019-11-22 | End: 2019-11-22 | Stop reason: HOSPADM

## 2019-11-22 RX ORDER — LIDOCAINE HYDROCHLORIDE 10 MG/ML
0.5 INJECTION, SOLUTION EPIDURAL; INFILTRATION; INTRACAUDAL; PERINEURAL ONCE AS NEEDED
Status: COMPLETED | OUTPATIENT
Start: 2019-11-22 | End: 2019-11-22

## 2019-11-22 RX ORDER — ACETAMINOPHEN 325 MG/1
650 TABLET ORAL ONCE
Status: COMPLETED | OUTPATIENT
Start: 2019-11-22 | End: 2019-11-22

## 2019-11-22 RX ORDER — LIDOCAINE HYDROCHLORIDE 10 MG/ML
0.5 INJECTION, SOLUTION EPIDURAL; INFILTRATION; INTRACAUDAL; PERINEURAL ONCE AS NEEDED
Status: DISCONTINUED | OUTPATIENT
Start: 2019-11-22 | End: 2019-11-22

## 2019-11-22 RX ORDER — PROMETHAZINE HYDROCHLORIDE 25 MG/ML
6.25 INJECTION, SOLUTION INTRAMUSCULAR; INTRAVENOUS ONCE AS NEEDED
Status: DISCONTINUED | OUTPATIENT
Start: 2019-11-22 | End: 2019-11-22 | Stop reason: HOSPADM

## 2019-11-22 RX ORDER — DEXAMETHASONE SODIUM PHOSPHATE 4 MG/ML
INJECTION, SOLUTION INTRA-ARTICULAR; INTRALESIONAL; INTRAMUSCULAR; INTRAVENOUS; SOFT TISSUE AS NEEDED
Status: DISCONTINUED | OUTPATIENT
Start: 2019-11-22 | End: 2019-11-22 | Stop reason: SURG

## 2019-11-22 RX ORDER — SODIUM CHLORIDE, SODIUM LACTATE, POTASSIUM CHLORIDE, CALCIUM CHLORIDE 600; 310; 30; 20 MG/100ML; MG/100ML; MG/100ML; MG/100ML
9 INJECTION, SOLUTION INTRAVENOUS CONTINUOUS
Status: DISCONTINUED | OUTPATIENT
Start: 2019-11-22 | End: 2019-11-22

## 2019-11-22 RX ORDER — FAMOTIDINE 10 MG/ML
20 INJECTION, SOLUTION INTRAVENOUS ONCE
Status: DISCONTINUED | OUTPATIENT
Start: 2019-11-22 | End: 2019-11-22

## 2019-11-22 RX ORDER — OXYCODONE HYDROCHLORIDE 5 MG/1
5 TABLET ORAL EVERY 4 HOURS PRN
Qty: 20 TABLET | Refills: 0 | Status: SHIPPED | OUTPATIENT
Start: 2019-11-22 | End: 2019-12-02

## 2019-11-22 RX ORDER — PREGABALIN 150 MG/1
150 CAPSULE ORAL ONCE
Status: COMPLETED | OUTPATIENT
Start: 2019-11-22 | End: 2019-11-22

## 2019-11-22 RX ORDER — KETAMINE HCL IN 0.9 % NACL 50 MG/5 ML
SYRINGE (ML) INTRAVENOUS AS NEEDED
Status: DISCONTINUED | OUTPATIENT
Start: 2019-11-22 | End: 2019-11-22 | Stop reason: SURG

## 2019-11-22 RX ORDER — IBUPROFEN 600 MG/1
600 TABLET ORAL EVERY 6 HOURS PRN
Qty: 30 TABLET | Refills: 0 | Status: SHIPPED | OUTPATIENT
Start: 2019-11-22 | End: 2020-05-14

## 2019-11-22 RX ORDER — DOCUSATE SODIUM 100 MG/1
200 CAPSULE, LIQUID FILLED ORAL 2 TIMES DAILY
Status: DISCONTINUED | OUTPATIENT
Start: 2019-11-22 | End: 2019-11-22 | Stop reason: HOSPADM

## 2019-11-22 RX ORDER — CELECOXIB 200 MG/1
200 CAPSULE ORAL ONCE
Status: COMPLETED | OUTPATIENT
Start: 2019-11-22 | End: 2019-11-22

## 2019-11-22 RX ORDER — SODIUM CHLORIDE 9 MG/ML
INJECTION, SOLUTION INTRAVENOUS AS NEEDED
Status: DISCONTINUED | OUTPATIENT
Start: 2019-11-22 | End: 2019-11-22 | Stop reason: HOSPADM

## 2019-11-22 RX ORDER — FAMOTIDINE 10 MG/ML
20 INJECTION, SOLUTION INTRAVENOUS ONCE
Status: CANCELLED | OUTPATIENT
Start: 2019-11-22 | End: 2019-11-22

## 2019-11-22 RX ORDER — FAMOTIDINE 20 MG/1
20 TABLET, FILM COATED ORAL ONCE
Status: COMPLETED | OUTPATIENT
Start: 2019-11-22 | End: 2019-11-22

## 2019-11-22 RX ORDER — SODIUM CHLORIDE, SODIUM LACTATE, POTASSIUM CHLORIDE, CALCIUM CHLORIDE 600; 310; 30; 20 MG/100ML; MG/100ML; MG/100ML; MG/100ML
9 INJECTION, SOLUTION INTRAVENOUS CONTINUOUS
Status: DISCONTINUED | OUTPATIENT
Start: 2019-11-22 | End: 2019-11-22 | Stop reason: HOSPADM

## 2019-11-22 RX ORDER — PROMETHAZINE HYDROCHLORIDE 25 MG/1
12.5 TABLET ORAL EVERY 6 HOURS PRN
Qty: 10 TABLET | Refills: 2 | Status: SHIPPED | OUTPATIENT
Start: 2019-11-22 | End: 2020-05-14

## 2019-11-22 RX ORDER — CEFAZOLIN SODIUM 2 G/100ML
2 INJECTION, SOLUTION INTRAVENOUS ONCE
Status: COMPLETED | OUTPATIENT
Start: 2019-11-22 | End: 2019-11-22

## 2019-11-22 RX ORDER — MAGNESIUM HYDROXIDE 1200 MG/15ML
LIQUID ORAL AS NEEDED
Status: DISCONTINUED | OUTPATIENT
Start: 2019-11-22 | End: 2019-11-22 | Stop reason: HOSPADM

## 2019-11-22 RX ORDER — DOCUSATE SODIUM 100 MG/1
200 CAPSULE, LIQUID FILLED ORAL 2 TIMES DAILY
Qty: 60 CAPSULE | Refills: 1 | Status: SHIPPED | OUTPATIENT
Start: 2019-11-22 | End: 2020-03-11 | Stop reason: SDUPTHER

## 2019-11-22 RX ORDER — SODIUM CHLORIDE 0.9 % (FLUSH) 0.9 %
3-10 SYRINGE (ML) INJECTION AS NEEDED
Status: DISCONTINUED | OUTPATIENT
Start: 2019-11-22 | End: 2019-11-22

## 2019-11-22 RX ADMIN — PROPOFOL 150 MG: 10 INJECTION, EMULSION INTRAVENOUS at 13:16

## 2019-11-22 RX ADMIN — CEFAZOLIN SODIUM 2 G: 2 INJECTION, SOLUTION INTRAVENOUS at 13:11

## 2019-11-22 RX ADMIN — DEXAMETHASONE SODIUM PHOSPHATE 8 MG: 4 INJECTION, SOLUTION INTRAMUSCULAR; INTRAVENOUS at 13:24

## 2019-11-22 RX ADMIN — MEPERIDINE HYDROCHLORIDE 12.5 MG: 25 INJECTION INTRAMUSCULAR; INTRAVENOUS; SUBCUTANEOUS at 17:03

## 2019-11-22 RX ADMIN — SODIUM CHLORIDE, POTASSIUM CHLORIDE, SODIUM LACTATE AND CALCIUM CHLORIDE 9 ML/HR: 600; 310; 30; 20 INJECTION, SOLUTION INTRAVENOUS at 10:58

## 2019-11-22 RX ADMIN — PREGABALIN 150 MG: 75 CAPSULE ORAL at 10:56

## 2019-11-22 RX ADMIN — FAMOTIDINE 20 MG: 20 TABLET ORAL at 10:56

## 2019-11-22 RX ADMIN — KETAMINE HYDROCHLORIDE 30 MG: 100 INJECTION INTRAMUSCULAR; INTRAVENOUS at 13:24

## 2019-11-22 RX ADMIN — LIDOCAINE HYDROCHLORIDE 50 MG: 10 INJECTION, SOLUTION EPIDURAL; INFILTRATION; INTRACAUDAL; PERINEURAL at 13:16

## 2019-11-22 RX ADMIN — CELECOXIB 200 MG: 200 CAPSULE ORAL at 10:55

## 2019-11-22 RX ADMIN — LIDOCAINE HYDROCHLORIDE 0.3 ML: 10 INJECTION, SOLUTION EPIDURAL; INFILTRATION; INTRACAUDAL; PERINEURAL at 10:55

## 2019-11-22 RX ADMIN — ACETAMINOPHEN 650 MG: 325 TABLET ORAL at 10:56

## 2019-11-22 RX ADMIN — PROPOFOL 25 MCG/KG/MIN: 10 INJECTION, EMULSION INTRAVENOUS at 13:25

## 2019-11-22 RX ADMIN — MEPERIDINE HYDROCHLORIDE 12.5 MG: 25 INJECTION INTRAMUSCULAR; INTRAVENOUS; SUBCUTANEOUS at 16:53

## 2019-11-22 RX ADMIN — FENTANYL CITRATE 50 MCG: 50 INJECTION, SOLUTION INTRAMUSCULAR; INTRAVENOUS at 14:57

## 2019-11-22 RX ADMIN — HYDROMORPHONE HYDROCHLORIDE 0.5 MG: 1 INJECTION, SOLUTION INTRAMUSCULAR; INTRAVENOUS; SUBCUTANEOUS at 16:42

## 2019-11-22 RX ADMIN — OXYCODONE HYDROCHLORIDE 5 MG: 5 TABLET ORAL at 16:58

## 2019-11-22 RX ADMIN — FENTANYL CITRATE 50 MCG: 50 INJECTION, SOLUTION INTRAMUSCULAR; INTRAVENOUS at 13:16

## 2019-11-22 RX ADMIN — ROCURONIUM BROMIDE 40 MG: 10 INJECTION INTRAVENOUS at 13:16

## 2019-11-22 NOTE — ANESTHESIA POSTPROCEDURE EVALUATION
Patient: Paty Marie    Procedure Summary     Date:  11/22/19 Room / Location:   [facility]    Anesthesia Start:  1310 Anesthesia Stop:      Procedure:  STAGING WITH DAVINCI ROBOT WITH LYMPH NODE DISSECTION (N/A Abdomen) Diagnosis:       Endometrial cancer (CMS/HCC)      (Endometrial cancer (CMS/HCC) [C54.1])    Surgeon:  Jennifer Holman MD Provider:  Cr Perez MD    Anesthesia Type:  general ASA Status:  3          Anesthesia Type: general  Last vitals  BP   135/80 (11/22/19 1602)   Temp   98.1 °F (36.7 °C) (11/22/19 1602)   Pulse   64 (11/22/19 1602)   Resp   16 (11/22/19 1045)     SpO2   95 % (11/22/19 1602)     Post Anesthesia Care and Evaluation    Patient location during evaluation: PACU  Patient participation: waiting for patient participation  Level of consciousness: responsive to verbal stimuli  Pain score: 0  Pain management: adequate  Airway patency: patent  Anesthetic complications: No anesthetic complications  PONV Status: none  Cardiovascular status: hemodynamically stable and acceptable  Respiratory status: nonlabored ventilation, acceptable and nasal cannula  Hydration status: acceptable

## 2019-11-22 NOTE — TELEPHONE ENCOUNTER
Pt called and said she is late for her surgery appt.  She was supposed to be at the hospital at 9:00am this morning and is just now arriving.  Her surgery is at noon.  Told her proceed as planned.  She v/u.

## 2019-11-22 NOTE — ANESTHESIA PROCEDURE NOTES
Airway  Urgency: elective    Date/Time: 11/22/2019 1:18 PM    General Information and Staff    Patient location during procedure: OR  CRNA: Jose Ramirez CRNA    Indications and Patient Condition  Indications for airway management: airway protection    Preoxygenated: yes  MILS not maintained throughout  Mask difficulty assessment: 2 - vent by mask + OA or adjuvant +/- NMBA    Final Airway Details  Final airway type: endotracheal airway      Successful airway: ETT  Cuffed: yes   Successful intubation technique: direct laryngoscopy  Endotracheal tube insertion site: oral  Blade: Wasserman  Blade size: 2  ETT size (mm): 7.0  Cormack-Lehane Classification: grade I - full view of glottis  Placement verified by: chest auscultation and capnometry   Cuff volume (mL): 6  Measured from: lips  ETT/EBT  to lips (cm): 21  Number of attempts at approach: 1

## 2019-11-22 NOTE — ANESTHESIA PREPROCEDURE EVALUATION
Anesthesia Evaluation     Patient summary reviewed and Nursing notes reviewed   NPO Solid Status: > 8 hours  NPO Liquid Status: > 8 hours           Airway   Mallampati: I  TM distance: >3 FB  Neck ROM: full  Dental    (+) edentulous    Pulmonary - normal exam   Cardiovascular   Exercise tolerance: good (4-7 METS)    Rhythm: regular  Rate: normal        Neuro/Psych  GI/Hepatic/Renal/Endo      Musculoskeletal     Abdominal  - normal exam   Substance History      OB/GYN          Other                      Anesthesia Plan    ASA 3     general     intravenous induction     Anesthetic plan, all risks, benefits, and alternatives have been provided, discussed and informed consent has been obtained with: patient.

## 2019-12-02 LAB
CYTO UR: NORMAL
LAB AP CASE REPORT: NORMAL
LAB AP CLINICAL INFORMATION: NORMAL
PATH REPORT.FINAL DX SPEC: NORMAL
PATH REPORT.GROSS SPEC: NORMAL

## 2019-12-03 ENCOUNTER — TELEPHONE (OUTPATIENT)
Dept: GYNECOLOGIC ONCOLOGY | Facility: CLINIC | Age: 59
End: 2019-12-03

## 2019-12-03 NOTE — TELEPHONE ENCOUNTER
----- Message from Jennifer Holman MD sent at 12/2/2019  8:13 PM EST -----  pls notify pt that LN negative for metastatic cancer  Will recommend vaginal brachytherapy    ----- Message -----  From: Lab, Background User  Sent: 12/2/2019   3:31 PM  To: Jennifer Holman MD      12/03/19:  I called pt to inform of the above.  No answer.  I left voicemail message for her to return my call.     12/03/19:  Pt returned my call.  Informed her of negative lymph nodes and recommendation for vaginal brachytherapy. Briefly explained this and told her that Dr. Holman will go over in detail at her post-op visit next week.  She verbalized understanding.

## 2019-12-12 ENCOUNTER — OFFICE VISIT (OUTPATIENT)
Dept: GYNECOLOGIC ONCOLOGY | Facility: CLINIC | Age: 59
End: 2019-12-12

## 2019-12-12 VITALS
DIASTOLIC BLOOD PRESSURE: 75 MMHG | RESPIRATION RATE: 12 BRPM | HEART RATE: 67 BPM | TEMPERATURE: 97.4 F | WEIGHT: 199 LBS | SYSTOLIC BLOOD PRESSURE: 140 MMHG | BODY MASS INDEX: 33.12 KG/M2

## 2019-12-12 DIAGNOSIS — C54.1 ENDOMETRIAL CANCER (HCC): ICD-10-CM

## 2019-12-12 DIAGNOSIS — Z98.890 POST-OPERATIVE STATE: Primary | ICD-10-CM

## 2019-12-12 DIAGNOSIS — R30.0 DYSURIA: ICD-10-CM

## 2019-12-12 DIAGNOSIS — N89.8 VAGINAL IRRITATION: ICD-10-CM

## 2019-12-12 LAB
BILIRUB UR QL STRIP: NEGATIVE
CLARITY UR: CLEAR
COLOR UR: YELLOW
GLUCOSE UR STRIP-MCNC: NEGATIVE MG/DL
HGB UR QL STRIP.AUTO: NEGATIVE
KETONES UR QL STRIP: NEGATIVE
LEUKOCYTE ESTERASE UR QL STRIP.AUTO: NEGATIVE
NITRITE UR QL STRIP: NEGATIVE
PH UR STRIP.AUTO: <=5 [PH] (ref 5–8)
PROT UR QL STRIP: NEGATIVE
SP GR UR STRIP: 1.01 (ref 1–1.03)
UROBILINOGEN UR QL STRIP: NORMAL

## 2019-12-12 PROCEDURE — 99024 POSTOP FOLLOW-UP VISIT: CPT | Performed by: OBSTETRICS & GYNECOLOGY

## 2019-12-12 PROCEDURE — 81003 URINALYSIS AUTO W/O SCOPE: CPT | Performed by: OBSTETRICS & GYNECOLOGY

## 2019-12-12 NOTE — PROGRESS NOTES
Paty Marie  [id number]  1960      Reason for visit:  Postoperative evaluation     History of present illness:  The patient is a 59 y.o. year old female who presents today for treatment and evaluation of the above issues.    Patient is status post laparoscopic lymph node dissection for incidentally noted stage Ib endometrial adenocarcinoma on 19      She is recovering well from a postoperative standpoint.  However, she complains of dysuria and vulvovaginal irritation.  She thinks she may have a urinary tract infection or yeast infection.    OBGYN History:  She is a .  She went through menopause at 51yo and does not use HRT. She does not have a history of abnormal pap smears.      Oncologic History:     Endometrial cancer (CMS/HCC)    2019 Surgery     Surgery       Procedure:    TOTAL LAPAROSCOPIC HYSTERECTOMY BILATERAL SALPINGOOPHORECTOMY WITH DAVINCI ROBOT, BIOPSIES OF LEFT VULVAR SKIN LESIONS             Final Diagnosis   1. 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   Lymphovascular invasion present.  No involvement of cervical stroma, fallopian tubes, ovaries or parametrium.   See comment  2. LEFT VULVA SKIN LESION:  Benign fibroepithelial polyps           2019 Initial Diagnosis     Endometrial cancer (CMS/HCC)      2019 Surgery     Surgery       Procedure:           STAGING WITH DAVINCI ROBOT WITH LYMPH NODE DISSECTION  Final Diagnosis   1. RIGHT PELVIC LYMPH NODE, EXCISION:  Lymph nodes x5, mild sinus histiocytosis and fibrosis, lymph nodes with no tumor or granulomatous inflammation. (0/5)  2. LEFT PELVIC LYMPH NODE, EXCISION:  Lymph nodes x4, mild sinus histiocytosis and fibrosis, lymph nodes with no tumor or granulomatous inflammation  identified. (0/4)  3. RIGHT PERIAORTIC LYMPH NODE, EXCISION:  Tiny lymph node and fibrovascular adipose connective tissue, no tumor or inflammation identified. (0/1)  4. RIGHT COMMON LYMPH NODE, EXCISION:  Lymph node fragment and fibrovascular and adipose connective tissue, no tumor or granulomas identified.   5. LEFT PERIAORTIC LYMPH NODE, EXCISION:  Lymph nodes x4 and fragments, sinus histocytosis and fibrosis, no tumor or granulomas identified. (0/4)  6. LEFT COMMON LYMPH NODE, EXCISION:  Lymph node x1, mild sinus histiocytosis and fibrosis, no tumor or granulomas identified. (0/1)                Past Medical History:   Diagnosis Date   • Arthritis    • Blood clotting disorder (CMS/HCC)    • Diabetes (CMS/HCC)    • Diastolic dysfunction    • Hypertension    • Lupus anticoagulant disorder (CMS/HCC)    • PONV (postoperative nausea and vomiting)        Past Surgical History:   Procedure Laterality Date   • LAPAROSCOPIC TUBAL LIGATION     • NECK SURGERY     • NODE DISSECTION LAPAROSCOPIC N/A 11/22/2019    Procedure: STAGING WITH DAVINCI ROBOT WITH LYMPH NODE DISSECTION;  Surgeon: Jennifer Holman MD;  Location: [facility] OR;  Service: DaVinci   • SKIN CANCER EXCISION     • TOTAL LAPAROSCOPIC HYSTERECTOMY SALPINGO OOPHORECTOMY Bilateral 8/30/2019    Procedure: TOTAL LAPAROSCOPIC HYSTERECTOMY BILATERAL SALPINGOOPHORECTOMY WITH DAVINCI ROBOT, BIOPSIES OF LEFT VULVAR SKIN LESIONS;  Surgeon: Jennifer Holman MD;  Location: [facility] OR;  Service: DaVinci       MEDICATIONS: The current medication list was reviewed with the patient and updated in the EMR this date per the Medical Assistant. Medication dosages and frequencies were confirmed to be accurate.      Allergies:  is allergic to erythromycin and zofran [ondansetron hcl].    Social History:   Social History     Socioeconomic History   • Marital status:      Spouse name: Not on file   • Number of children: 2   • Years of education: Not on file   • Highest  education level: Not on file   Tobacco Use   • Smoking status: Former Smoker     Types: Electronic Cigarette     Last attempt to quit: 2018     Years since quittin.9   • Smokeless tobacco: Never Used   • Tobacco comment: vapes ecig   Substance and Sexual Activity   • Alcohol use: No     Frequency: Never   • Drug use: No   • Sexual activity: Defer       Family History:  No family history on file.    Health Maintenance:    Health Maintenance   Topic Date Due   • MAMMOGRAM  1960   • ANNUAL PHYSICAL  1965   • TDAP/TD VACCINES (1 - Tdap) 1973   • PNEUMOCOCCAL VACCINE (19-64 MEDIUM RISK) (1 of 1 - PPSV23) 1981   • ZOSTER VACCINE (1 of 2) 2012   • HEPATITIS C SCREENING  2019   • COLONOSCOPY  2019   • INFLUENZA VACCINE  2019   • PAP SMEAR  2022         Review of Systems   Constitutional: Negative for activity change, appetite change, chills, fatigue, fever and unexpected weight change.   HENT: Negative for congestion, hearing loss, sinus pressure, sinus pain, sneezing and sore throat.    Eyes: Negative for discharge, redness, itching and visual disturbance.   Respiratory: Negative for cough, shortness of breath and wheezing.    Cardiovascular: Negative for chest pain, palpitations and leg swelling.   Gastrointestinal: Negative for abdominal distention, abdominal pain, blood in stool, constipation, diarrhea, nausea and vomiting.   Endocrine: Negative.  Negative for cold intolerance and heat intolerance.   Genitourinary: Positive for dysuria. Negative for difficulty urinating, dyspareunia, frequency, genital sores, hematuria, pelvic pain, urgency, vaginal bleeding, vaginal discharge and vaginal pain.        Vaginal irritation   Musculoskeletal: Positive for back pain and neck pain. Negative for arthralgias, gait problem and joint swelling.   Skin: Negative for color change, rash and wound.   Allergic/Immunologic: Negative for environmental allergies, food  allergies and immunocompromised state.   Neurological: Negative for dizziness, seizures, syncope, weakness, light-headedness, numbness and headaches.   Hematological: Negative for adenopathy. Does not bruise/bleed easily.   Psychiatric/Behavioral: Negative for agitation, confusion, decreased concentration, dysphoric mood, hallucinations and sleep disturbance. The patient is nervous/anxious.        Physical Exam    Vitals:    12/12/19 1424   BP: 140/75   Pulse: 67   Resp: 12   Temp: 97.4 °F (36.3 °C)   Weight: 90.3 kg (199 lb)   PainSc:   5     Body mass index is 33.12 kg/m².    Wt Readings from Last 3 Encounters:   12/12/19 90.3 kg (199 lb)   11/22/19 89.8 kg (198 lb)   10/23/19 89.8 kg (198 lb)         GENERAL: Alert, well-appearing female appearing her stated age who is in no apparent distress.   HEENT: Sclera anicteric. Head normocephalic, atraumatic. Mucus membranes moist.   NECK: Deferred  BREASTS: Deferred  CARDIOVASCULAR: Deferred, no bilateral lower extremity edema  RESPIRATORY: Deferred  BACK:  Deferred  GASTROINTESTINAL: Abdomen soft, nontender, nondistended, no rebound/guarding.  Incisions are clean dry and intact throughout.  SKIN:  Warm, dry, well-perfused.  All visible areas intact.  No rashes, lesions, ulcers.  PSYCHIATRIC: AO x3, with appropriate affect, normal thought processes.  NEUROLOGIC: No focal deficits.  Moves extremities well.  MUSCULOSKELETAL: Normal gait and station.   EXTREMITIES:   No cyanosis, clubbing, symmetric.  LYMPHATICS:  Deferred     PELVIC exam:    External genitalia were free from lesion.  However, abrasion was noted at the posterior fourchette.  On speculum examination, vaginal cuff was intact.  No lesion was noted.  On bimanual examination, uterus cervix and adnexa are surgically absent.    ECOG PS 0    PROCEDURES:  none    Diagnostic Data:      PATHOLOGY 11/22/19  Final Diagnosis   1. RIGHT PELVIC LYMPH NODE, EXCISION:  Lymph nodes x5, mild sinus histiocytosis and fibrosis,  lymph nodes with no tumor or granulomatous inflammation. (0/5)  2. LEFT PELVIC LYMPH NODE, EXCISION:  Lymph nodes x4, mild sinus histiocytosis and fibrosis, lymph nodes with no tumor or granulomatous inflammation identified. (0/4)  3. RIGHT PERIAORTIC LYMPH NODE, EXCISION:  Tiny lymph node and fibrovascular adipose connective tissue, no tumor or inflammation identified. (0/1)  4. RIGHT COMMON LYMPH NODE, EXCISION:  Lymph node fragment and fibrovascular and adipose connective tissue, no tumor or granulomas identified.   5. LEFT PERIAORTIC LYMPH NODE, EXCISION:  Lymph nodes x4 and fragments, sinus histocytosis and fibrosis, no tumor or granulomas identified. (0/4)  6. LEFT COMMON LYMPH NODE, EXCISION:  Lymph node x1, mild sinus histiocytosis and fibrosis, no tumor or granulomas identified. (0/1)         Ct Abdomen Pelvis Without Contrast    Result Date: 9/20/2019  Postsurgical changes seen from hysterectomy. There is no evidence of metastatic or recurrent disease. No acute intra-abdominal or  pelvic abnormality identified.  DICTATED:   09/19/2019 EDITED/ls :   09/19/2019   This report was finalized on 9/20/2019 4:29 PM by Dr. Ladi Mckeon MD.      Xr Chest 1 View    Result Date: 8/30/2019  Impression: No acute cardiopulmonary pathology. Signer Name: Boubacar Roman MD  Signed: 8/30/2019 7:56 AM  Workstation Name: [id number]  [facility]      Lab Results   Component Value Date    WBC 6.01 11/22/2019    HGB 12.6 11/22/2019    HCT 39.7 11/22/2019    MCV 91.3 11/22/2019     11/22/2019    NEUTROABS 3.53 11/22/2019    GLUCOSE 135 (H) 11/22/2019    BUN 7 11/22/2019    CREATININE 0.80 11/22/2019    EGFRIFNONA 74 11/22/2019    EGFRIFAFRI  09/08/2016      Comment:      <15 Indicative of kidney failure.     11/22/2019    K 3.7 11/22/2019     11/22/2019    CO2 27.0 11/22/2019    CALCIUM 9.2 11/22/2019    ALBUMIN 4.10 11/22/2019    AST 16 11/22/2019    ALT 18 11/22/2019    BILITOT  0.5 11/22/2019     No results found for:         Assessment/Plan   This is a 59 y.o. woman with Stage IB endometrial cancer.     Lymph node dissection negative for metastatic disease and stage confirmed.  Patient was referred to radiation oncology for vaginal brachytherapy.    Patient is to follow-up for survivorship 3 months after completion of radiation.    I think her vulvovaginal irritation and dysuria is related to disrupted skin at the posterior fourchette.  Silvadene cream was prescribed.  Urinalysis was obtained to exclude UTI.    Pain assessment was performed today as a part of patient’s care.  For patients with pain related to surgery, gynecologic malignancy or cancer treatment, the plan is as noted in the assessment/plan.  For patients with pain not related to these issues, they are to seek any further needed care from a more appropriate provider, such as PCP.    Orders Placed This Encounter   Procedures   • Urinalysis With Culture If Indicated - Urine, Clean Catch     Standing Status:   Future     Number of Occurrences:   1     Standing Expiration Date:   12/12/2020       Electronically Signed by: Jennifer Holman MD  Date: 12/12/2019

## 2019-12-13 ENCOUNTER — TELEPHONE (OUTPATIENT)
Dept: GYNECOLOGIC ONCOLOGY | Facility: CLINIC | Age: 59
End: 2019-12-13

## 2019-12-13 NOTE — TELEPHONE ENCOUNTER
Jennifer Holman MD  [facility]             Pls notify pt of negative UA   Thanks!      Phoned pt, informed her of results, pt v/u.

## 2020-01-23 ENCOUNTER — OFFICE VISIT (OUTPATIENT)
Dept: RADIATION ONCOLOGY | Facility: HOSPITAL | Age: 60
End: 2020-01-23

## 2020-01-23 ENCOUNTER — HOSPITAL ENCOUNTER (OUTPATIENT)
Dept: RADIATION ONCOLOGY | Facility: HOSPITAL | Age: 60
Setting detail: RADIATION/ONCOLOGY SERIES
Discharge: HOME | End: 2020-01-23

## 2020-01-23 VITALS
BODY MASS INDEX: 33.2 KG/M2 | SYSTOLIC BLOOD PRESSURE: 159 MMHG | WEIGHT: 199.3 LBS | OXYGEN SATURATION: 96 % | HEIGHT: 65 IN | RESPIRATION RATE: 16 BRPM | TEMPERATURE: 97.6 F | HEART RATE: 64 BPM | DIASTOLIC BLOOD PRESSURE: 77 MMHG

## 2020-01-23 DIAGNOSIS — C54.1 ENDOMETRIAL CANCER (HCC): ICD-10-CM

## 2020-01-23 PROCEDURE — G0463 HOSPITAL OUTPT CLINIC VISIT: HCPCS | Performed by: RADIOLOGY

## 2020-01-23 RX ORDER — ARIPIPRAZOLE 5 MG/1
TABLET ORAL
COMMUNITY
Start: 2019-12-12 | End: 2020-05-14

## 2020-01-23 RX ORDER — CITALOPRAM 40 MG/1
TABLET ORAL
COMMUNITY
Start: 2020-01-02

## 2020-01-23 RX ORDER — ALBUTEROL SULFATE 2.5 MG/3ML
SOLUTION RESPIRATORY (INHALATION)
COMMUNITY
Start: 2019-12-23 | End: 2020-05-14

## 2020-01-23 NOTE — PROGRESS NOTES
CONSULTATION NOTE      :                                                          1960  DATE OF CONSULTATION:                       2020   REQUESTING PHYSICIAN:                   Jennifer Holman MD  REASON FOR CONSULTATION:            Endometrial cancer (CMS/[facility])  - FIGO Stage I, calculated as Stage Unknown (cT1b, cNX, cM0)    Thank you for requesting my services in evaluation of this pleasant individual.  I am seeing them in outpatient consultation regarding a diagnosis of uterine cancer and for consideration of vaginal brachytherapy.     BRIEF HISTORY:  The patient is a very pleasant 59 y.o. female  with specific past medical history who presented with symptoms of vaginal bleeding in 2019.  She underwent an endometrial biopsy that showed complex endometrial hyperplasia with atypia.  She then underwent a total laparoscopic abdominal hysterectomy which showed a grade 1 endometrioid adenocarcinoma with 60% depth of invasion to the myometrium and evidence of lymphovascular space invasion.  She was then taken back to the operating room and underwent a lymph node dissection that revealed no evidence of lymph node involvement and a total of 15 lymph nodes.  She has since recovered very well from her surgery and is being referred to my clinic for consideration of adjuvant vaginal cuff brachytherapy alone.  From a symptomatic standpoint she denies pain, nausea, vomiting, or any other gastrointestinal symptoms.    Allergies   Allergen Reactions   • Erythromycin Nausea Only   • Zofran [Ondansetron Hcl] Other (See Comments)     Pt has never taken but does does not want due to her daughter's experience with it.       Social History     Socioeconomic History   • Marital status:      Spouse name: Not on file   • Number of children: 2   • Years of education: Not on file   • Highest education level: Not on file   Tobacco Use   • Smoking status: Former Smoker     Types: Electronic Cigarette     Last  "attempt to quit: 2018     Years since quittin.0   • Smokeless tobacco: Never Used   • Tobacco comment: vapes ecig   Substance and Sexual Activity   • Alcohol use: No     Frequency: Never   • Drug use: No   • Sexual activity: Defer       Past Medical History:   Diagnosis Date   • Arthritis    • Blood clotting disorder (CMS/HCC)    • Diabetes (CMS/HCC)    • Diastolic dysfunction    • Hypertension    • Lupus anticoagulant disorder (CMS/HCC)    • PONV (postoperative nausea and vomiting)        family history is not on file.     Past Surgical History:   Procedure Laterality Date   • LAPAROSCOPIC TUBAL LIGATION     • NECK SURGERY     • NODE DISSECTION LAPAROSCOPIC N/A 2019    Procedure: STAGING WITH DAVINCI ROBOT WITH LYMPH NODE DISSECTION;  Surgeon: Jennifer Holman MD;  Location:  [facility] OR;  Service: DaVinci   • SKIN CANCER EXCISION     • TOTAL LAPAROSCOPIC HYSTERECTOMY SALPINGO OOPHORECTOMY Bilateral 2019    Procedure: TOTAL LAPAROSCOPIC HYSTERECTOMY BILATERAL SALPINGOOPHORECTOMY WITH DAVINCI ROBOT, BIOPSIES OF LEFT VULVAR SKIN LESIONS;  Surgeon: Jennifer Holman MD;  Location:  [facility] OR;  Service: DaVinci        Review of Systems   Constitutional: Positive for fatigue.   Respiratory: Positive for cough (phlegm- sees pcp in a.m.).    Gastrointestinal: Positive for constipation.   Endocrine: Positive for hot flashes.   Musculoskeletal: Positive for back pain (chronic).   Psychiatric/Behavioral: Positive for depression. The patient is nervous/anxious.    All other systems reviewed and are negative.          Objective   VITAL SIGNS:   Vitals:    20 1306   BP: 159/77   Pulse: 64   Resp: 16   Temp: 97.6 °F (36.4 °C)   TempSrc: Temporal   SpO2: 96%  Comment: RA   Weight: 90.4 kg (199 lb 4.8 oz)   Height: 165.1 cm (65\")   PainSc: 0-No pain        Karnofsky score: 80        Physical Exam   Constitutional: She is oriented to person, place, and time. She appears well-developed and well-nourished. " No distress.   HENT:   Head: Normocephalic and atraumatic.   Mouth/Throat: Oropharynx is clear and moist.   Eyes: Pupils are equal, round, and reactive to light. Conjunctivae and EOM are normal.   Neck: Normal range of motion. Neck supple.   Cardiovascular: Normal rate and regular rhythm. Exam reveals no friction rub.   No murmur heard.  Pulmonary/Chest: Effort normal and breath sounds normal. She has no wheezes.   Abdominal: Soft. Bowel sounds are normal. She exhibits no distension and no mass. There is no tenderness.   Multiple laparoscopic port incisions that are well-healed.  No evidence of hernias.   Genitourinary:   Genitourinary Comments: Vaginal cuff is well-healed without any abnormal nodules, masses, or induration.  There is a small linear area at the left aspect of the introitus consistent with a traumatic cut.  It measures approximately 5 mm in length.   Musculoskeletal: Normal range of motion. She exhibits no edema.   Lymphadenopathy:     She has no cervical adenopathy.   Neurological: She is alert and oriented to person, place, and time.   Skin: Skin is warm and dry.   Psychiatric: She has a normal mood and affect. Her behavior is normal. Judgment and thought content normal.   Nursing note and vitals reviewed.    IMAGING  I have personally reviewed the relevant imaging studies, as follows:  CT abdomen/pelvis 9/19/2019:  IMPRESSION:  Postsurgical changes seen from hysterectomy. There is no  evidence of metastatic or recurrent disease. No acute intra-abdominal or   pelvic abnormality identified.    PATHOLOGY  1. RIGHT PELVIC LYMPH NODE, EXCISION (11/22/2019):  Lymph nodes x5, mild sinus histiocytosis and fibrosis, lymph nodes with no tumor or granulomatous inflammation. (0/5)  2. LEFT PELVIC LYMPH NODE, EXCISION:  Lymph nodes x4, mild sinus histiocytosis and fibrosis, lymph nodes with no tumor or granulomatous inflammation identified. (0/4)  3. RIGHT PERIAORTIC LYMPH NODE, EXCISION:  Tiny lymph node and  fibrovascular adipose connective tissue, no tumor or inflammation identified. (0/1)  4. RIGHT COMMON LYMPH NODE, EXCISION:  Lymph node fragment and fibrovascular and adipose connective tissue, no tumor or granulomas identified.   5. LEFT PERIAORTIC LYMPH NODE, EXCISION:  Lymph nodes x4 and fragments, sinus histocytosis and fibrosis, no tumor or granulomas identified. (0/4)  6. LEFT COMMON LYMPH NODE, EXCISION:  Lymph node x1, mild sinus histiocytosis and fibrosis, no tumor or granulomas identified. (0/1)    1. UTERUS, CERVIX, BILATERAL OVARIES AND FALLOPIAN TUBES, HYSTERECTOMY AND BILATERAL SALPINGO-OOPHORECTOMY (8/30/2019):  Endometrioid adenocarcinoma, FIGO grade 1 with focal squamous morular metaplasia, invasive to a depth of 11 mm with myometrial thickness of 18 mm (measured on glass slide).   Lymphovascular invasion present.  No involvement of cervical stroma, fallopian tubes, ovaries or parametrium.   See comment  2. LEFT VULVA SKIN LESION:  Benign fibroepithelial polyps.      The following portions of the patient's history were reviewed and updated as appropriate: allergies, current medications, past family history, past medical history, past social history, past surgical history and problem list.    Assessment  Mrs. Marie is a 59 year old female with a 1A grade 1 endometrioid adenocarcinoma of the uterus.  She has completed a hysterectomy and staging.  She will benefit from a course of adjuvant vaginal cuff brachytherapy alone.  This will consist of 5 separate insertions, each delivering a dose of 6 Gy to the vaginal surface for a total cumulative dose of 30 Gy.  I spent approximately 45 minutes today with the patient, her daughter, and her granddaughter discussing the risks, benefits, logistics, and rationale for adjuvant radiation treatments.  She was agreeable and signed informed consent.  We will schedule her to return to my clinic in 1 week's time to undergo a radiation set up and simulation session,  and I anticipate that we should build to begin her treatments shortly thereafter pending insurance authorization.    RECOMMENDATIONS:      Return in about 1 week (around 1/30/2020) for Brachytherapy Simulation.  Paty was seen today for uterine cancer.    Diagnoses and all orders for this visit:    Endometrial cancer (CMS/[facility])    Thank you for allowing me to participate in the care of this individual.    Sincerely,       Lino Yeboah MD

## 2020-01-29 ENCOUNTER — HOSPITAL ENCOUNTER (OUTPATIENT)
Dept: RADIATION ONCOLOGY | Facility: HOSPITAL | Age: 60
Discharge: HOME | End: 2020-01-29

## 2020-01-29 PROCEDURE — 77290 THER RAD SIMULAJ FIELD CPLX: CPT | Performed by: RADIOLOGY

## 2020-01-30 PROCEDURE — 77295 3-D RADIOTHERAPY PLAN: CPT | Performed by: RADIOLOGY

## 2020-02-03 ENCOUNTER — HOSPITAL ENCOUNTER (OUTPATIENT)
Dept: RADIATION ONCOLOGY | Facility: HOSPITAL | Age: 60
Discharge: HOME | End: 2020-02-03

## 2020-02-03 ENCOUNTER — HOSPITAL ENCOUNTER (OUTPATIENT)
Dept: RADIATION ONCOLOGY | Facility: HOSPITAL | Age: 60
Setting detail: RADIATION/ONCOLOGY SERIES
Discharge: HOME | End: 2020-02-03

## 2020-02-03 PROCEDURE — C1717 BRACHYTX, NON-STR,HDR IR-192: HCPCS | Performed by: RADIOLOGY

## 2020-02-03 PROCEDURE — 57156 INS VAG BRACHYTX DEVICE: CPT | Performed by: RADIOLOGY

## 2020-02-03 PROCEDURE — 77770 HDR RDNCL NTRSTL/ICAV BRCHTX: CPT | Performed by: RADIOLOGY

## 2020-02-03 NOTE — PROGRESS NOTES
02/03/2020  Paty Marie    HDR#   1  Patient presents for cylinder brachytherapy.  She has no complaints of urinary discomfort, diarrhea or vaginal irritation.  The cylinder was inserted and treatment of 6 Gy to the surface of the upper vaginal mucosa was delivered. Patient tolerated procedure well with no complications.  Physics survey was negative with no residual radioactivity.  Discharged to home in good condition.  She will return for next treatment.

## 2020-02-05 ENCOUNTER — HOSPITAL ENCOUNTER (OUTPATIENT)
Dept: RADIATION ONCOLOGY | Facility: HOSPITAL | Age: 60
Discharge: HOME | End: 2020-02-05

## 2020-02-05 PROCEDURE — 57156 INS VAG BRACHYTX DEVICE: CPT | Performed by: RADIOLOGY

## 2020-02-05 PROCEDURE — C1717 BRACHYTX, NON-STR,HDR IR-192: HCPCS | Performed by: RADIOLOGY

## 2020-02-05 PROCEDURE — 77770 HDR RDNCL NTRSTL/ICAV BRCHTX: CPT | Performed by: RADIOLOGY

## 2020-02-05 NOTE — PROGRESS NOTES
02/05/2020  Paty Marie    HDR#   2  Patient presents for cylinder brachytherapy.  She has no complaints of urinary discomfort, diarrhea or vaginal irritation.  The cylinder was inserted and treatment of 6 Gy to the surface of the upper vaginal mucosa was delivered. Patient tolerated procedure well with no complications.  Physics survey was negative with no residual radioactivity.  Discharged to home in good condition.  She will return for next treatment.

## 2020-02-07 ENCOUNTER — HOSPITAL ENCOUNTER (OUTPATIENT)
Dept: RADIATION ONCOLOGY | Facility: HOSPITAL | Age: 60
Discharge: HOME | End: 2020-02-07

## 2020-02-07 PROCEDURE — 77770 HDR RDNCL NTRSTL/ICAV BRCHTX: CPT | Performed by: RADIOLOGY

## 2020-02-07 PROCEDURE — 57156 INS VAG BRACHYTX DEVICE: CPT | Performed by: RADIOLOGY

## 2020-02-07 PROCEDURE — C1717 BRACHYTX, NON-STR,HDR IR-192: HCPCS | Performed by: RADIOLOGY

## 2020-02-07 NOTE — PROGRESS NOTES
02/07/2020  Paty Marie    HDR#   3  Patient presents for cylinder brachytherapy.  She has no complaints of urinary discomfort, diarrhea or vaginal irritation.  The cylinder was inserted and treatment of 6 Gy to the surface of the upper vaginal mucosa was delivered. Patient tolerated procedure well with no complications.  Physics survey was negative with no residual radioactivity.  Discharged to home in good condition.  She will return for next treatment.

## 2020-02-10 ENCOUNTER — HOSPITAL ENCOUNTER (OUTPATIENT)
Dept: RADIATION ONCOLOGY | Facility: HOSPITAL | Age: 60
Discharge: HOME | End: 2020-02-10

## 2020-02-10 DIAGNOSIS — C54.1 ENDOMETRIAL CANCER (HCC): Primary | ICD-10-CM

## 2020-02-10 DIAGNOSIS — I25.10 CORONARY ARTERY DISEASE INVOLVING NATIVE CORONARY ARTERY OF NATIVE HEART WITHOUT ANGINA PECTORIS: ICD-10-CM

## 2020-02-10 PROCEDURE — 77770 HDR RDNCL NTRSTL/ICAV BRCHTX: CPT | Performed by: RADIOLOGY

## 2020-02-10 PROCEDURE — 57156 INS VAG BRACHYTX DEVICE: CPT | Performed by: RADIOLOGY

## 2020-02-10 PROCEDURE — C1717 BRACHYTX, NON-STR,HDR IR-192: HCPCS | Performed by: RADIOLOGY

## 2020-02-10 NOTE — PROGRESS NOTES
02/10/2020  Paty Marie    HDR#   4  Patient presents for cylinder brachytherapy.  She has no complaints of urinary discomfort, diarrhea or vaginal irritation.  The cylinder was inserted and treatment of 6 Gy to the surface of the upper vaginal mucosa was delivered. Patient tolerated procedure well with no complications.  Physics survey was negative with no residual radioactivity.  Discharged to home in good condition.  She will return for next treatment.

## 2020-02-12 ENCOUNTER — HOSPITAL ENCOUNTER (OUTPATIENT)
Dept: RADIATION ONCOLOGY | Facility: HOSPITAL | Age: 60
Discharge: HOME | End: 2020-02-12

## 2020-02-12 PROCEDURE — 57156 INS VAG BRACHYTX DEVICE: CPT | Performed by: RADIOLOGY

## 2020-02-12 PROCEDURE — 77336 RADIATION PHYSICS CONSULT: CPT | Performed by: RADIOLOGY

## 2020-02-12 PROCEDURE — C1717 BRACHYTX, NON-STR,HDR IR-192: HCPCS | Performed by: RADIOLOGY

## 2020-02-12 PROCEDURE — 77770 HDR RDNCL NTRSTL/ICAV BRCHTX: CPT | Performed by: RADIOLOGY

## 2020-02-12 NOTE — PROGRESS NOTES
02/12/2020  Paty Marie    HDR#   5  Patient presents for cylinder brachytherapy.  She has no complaints of urinary discomfort, diarrhea or vaginal irritation.  The cylinder was inserted and treatment of 6 Gy to the surface of the upper vaginal mucosa was delivered. Patient tolerated procedure well with no complications.  Physics survey was negative with no residual radioactivity.  Discharged to home in good condition.  She will return to Radiation Oncology in the next few weeks for follow up.

## 2020-02-12 NOTE — RADIATION COMPLETION NOTES
DATE OF COMPLETION: 1/12/2020  DIAGNOSIS: Uterine Cancer    REFERRING: Jenniefr Holman MD        Paty Hinton completed radiation therapy today.      BACKGROUND: Paty Marie 59-year-old female who is been diagnosed with a high intermediate risk endometrial cancer.  She underwent a hysterectomy and staging, and then received adjuvant vaginal brachii therapy alone as detailed below:    Treatment Summary     Dates of Therapy: 2/3/2020-2/12/2020  Treatment Site: Upper vagina  Dose: 30 Gy in 5 fractions of 6 Gy each  Technique: Her treatments utilized a single-channel 2.5 cm vaginal cylinder with iridium-192 sources calculated to deliver the dose to the vaginal surface with an active length of 3 cm.    Treatment Course and Tolerance: She tolerated treatments very well and did not develop any acute radiation related toxicities.    The initial follow up visit will be in 1 month.    Paty Marie knows to call if any problems or concerns develop in the meantime.     Electronically signed by: Lino Yeboah MD                    Cc: Kreis, Samuel Duane, MD

## 2020-03-11 ENCOUNTER — HOSPITAL ENCOUNTER (OUTPATIENT)
Dept: RADIATION ONCOLOGY | Facility: HOSPITAL | Age: 60
Setting detail: RADIATION/ONCOLOGY SERIES
Discharge: HOME | End: 2020-03-11

## 2020-03-11 ENCOUNTER — OFFICE VISIT (OUTPATIENT)
Dept: RADIATION ONCOLOGY | Facility: HOSPITAL | Age: 60
End: 2020-03-11

## 2020-03-11 VITALS
DIASTOLIC BLOOD PRESSURE: 67 MMHG | BODY MASS INDEX: 32.47 KG/M2 | WEIGHT: 194.9 LBS | RESPIRATION RATE: 17 BRPM | HEART RATE: 57 BPM | OXYGEN SATURATION: 96 % | SYSTOLIC BLOOD PRESSURE: 141 MMHG | TEMPERATURE: 97.7 F | HEIGHT: 65 IN

## 2020-03-11 DIAGNOSIS — K59.00 CONSTIPATION, UNSPECIFIED CONSTIPATION TYPE: ICD-10-CM

## 2020-03-11 DIAGNOSIS — C54.1 ENDOMETRIAL CANCER (HCC): Primary | ICD-10-CM

## 2020-03-11 PROCEDURE — G0463 HOSPITAL OUTPT CLINIC VISIT: HCPCS

## 2020-03-11 RX ORDER — DOCUSATE SODIUM 100 MG/1
200 CAPSULE, LIQUID FILLED ORAL 2 TIMES DAILY
Qty: 120 CAPSULE | Refills: 11 | Status: SHIPPED | OUTPATIENT
Start: 2020-03-11 | End: 2020-03-11

## 2020-03-11 RX ORDER — DOCUSATE SODIUM 100 MG/1
100 CAPSULE, LIQUID FILLED ORAL 2 TIMES DAILY
Qty: 180 CAPSULE | Refills: 3 | Status: SHIPPED | OUTPATIENT
Start: 2020-03-11 | End: 2021-02-02 | Stop reason: SDUPTHER

## 2020-03-11 NOTE — PROGRESS NOTES
FOLLOW UP NOTE    PATIENT:                                                      Paty Marie  MEDICAL RECORD #:                        [id number]  :                                                          1960  COMPLETION DATE:    2020  DIAGNOSIS:     Endometrial cancer (CMS/HCC)  - FIGO Stage I (cT1b, cNX, cM0)      BRIEF HISTORY:  Mrs. Marie is a 59-year-old female returning for initial follow-up.  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  She underwent hysterectomy in August, followed by staging and lymph node dissection in 2019.  She then completed a course of adjuvant vaginal brachytherapy in an effort to limit her risk of developing a vaginal recurrence.  The vaginal cuff received a dose of 30 Gray in 5 fractions.  She tolerated treatment very well and experienced virtually no side effects.  She denies any genitourinary or vaginal complaints, including pain, bleeding, or discharge.  She has a history of chronic constipation with 2-3 BMs weekly.  She experienced grade 1 diarrhea following the fourth treatment, though this has since subsided and she is back to her baseline constipation.  She did not notice any new or worsening fatigue beyond her baseline.      MEDICATIONS: Medication reconciliation for the patient was reviewed and confirmed in the electronic medical record.    Review of Systems   Constitutional: Positive for fatigue.   Gastrointestinal: Positive for constipation.   Musculoskeletal: Positive for back pain.   Psychiatric/Behavioral: Positive for sleep disturbance.   All other systems reviewed and are negative.      KPS 90%    Physical Exam   Constitutional: She is oriented to person, place, and time. She appears well-developed and well-nourished. No distress.   HENT:   Head: Normocephalic and atraumatic.   Eyes: Pupils are equal, round, and reactive to light. Conjunctivae  "are normal.   Neck: Normal range of motion. Neck supple.   Cardiovascular: Normal rate and regular rhythm. Exam reveals no friction rub.   No murmur heard.  Pulmonary/Chest: Effort normal and breath sounds normal. She has no wheezes.   Abdominal: Soft. Bowel sounds are normal. She exhibits no distension and no mass. There is no tenderness.   Multiple well-healed laparoscopic port incision sites.   Genitourinary:   Genitourinary Comments: GYN/pelvic exam: Deferred   Musculoskeletal: Normal range of motion. She exhibits edema.   BLE 1+ edema   Lymphadenopathy:     She has no cervical adenopathy.        Right: No supraclavicular adenopathy present.        Left: No supraclavicular adenopathy present.   Neurological: She is alert and oriented to person, place, and time.   Skin: Skin is warm and dry.   Psychiatric: She has a normal mood and affect. Her behavior is normal. Judgment and thought content normal.   Nursing note and vitals reviewed.      VITAL SIGNS:   Vitals:    03/11/20 1507   BP: 141/67   Pulse: 57   Resp: 17   Temp: 97.7 °F (36.5 °C)   TempSrc: Temporal   SpO2: 96%  Comment: RA   Weight: 88.4 kg (194 lb 14.4 oz)   Height: 165.1 cm (65\")   PainSc:   4   PainLoc: Back  Comment: lower back       The following portions of the patient's history were reviewed and updated as appropriate: allergies, current medications, past family history, past medical history, past social history, past surgical history and problem list.      Paty was seen today for endometrial cancer.    Diagnoses and all orders for this visit:    Endometrial cancer (CMS/[facility]AC, located within [facility])    Constipation, unspecified constipation type  -     docusate sodium (COLACE) 100 MG capsule; Take 1 capsule by mouth 2 (Two) Times a Day.      IMPRESSION:   Mrs. Marie is a 59-year-old female with history of a grade 1 endometrioid adenocarcinoma of the uterus, status post hysterectomy and staging.  She completed adjuvant brachytherapy to the vaginal cuff approximately 1 " month ago.  She tolerated brachytherapy without difficulty and has no residual symptoms of radiation-related toxicities.  She was given a size small vaginal dilator and printed instructions for use, and we discussed this at length.  Given her history of high risk features, including depth of invasion and LVSI, I would anticipate close surveillance under the care of her GYN oncologist.  GYN/pelvic exam was deferred today, as she will be seen in the GYN oncology clinic at the end of this month.  I have sent a prescription for Colace and discussed soluble and insoluble fiber as well as increased fluid intake.       RECOMMENDATIONS:   Mrs. Marie plans to remain under the care of Dr. Holman for survivorship and surveillance visits.      Return if symptoms worsen or fail to improve, for Office Visit.    Chris Britton, APRN

## 2020-03-18 ENCOUNTER — TELEPHONE (OUTPATIENT)
Dept: GYNECOLOGIC ONCOLOGY | Facility: CLINIC | Age: 60
End: 2020-03-18

## 2020-03-25 ENCOUNTER — TELEPHONE (OUTPATIENT)
Dept: GYNECOLOGIC ONCOLOGY | Facility: CLINIC | Age: 60
End: 2020-03-25

## 2020-03-25 NOTE — TELEPHONE ENCOUNTER
PT CANCELLED HER APPT ON 3/26/20 DUE TO THE VIRUS. SHE WOULD LIKE TO ELVA IN MAY.   I TRIED FOR MID MAY AND IT PUSHED ME OUT UNTIL END OF June.   CAN YOU PLEASE GIVE PT A CALL TO ELVA APPT.    JONA  [phone]

## 2020-05-14 ENCOUNTER — OFFICE VISIT (OUTPATIENT)
Dept: CARDIOLOGY | Facility: CLINIC | Age: 60
End: 2020-05-14

## 2020-05-14 VITALS
TEMPERATURE: 97.8 F | HEART RATE: 53 BPM | WEIGHT: 201 LBS | BODY MASS INDEX: 33.49 KG/M2 | SYSTOLIC BLOOD PRESSURE: 148 MMHG | HEIGHT: 65 IN | DIASTOLIC BLOOD PRESSURE: 90 MMHG

## 2020-05-14 DIAGNOSIS — E78.00 HYPERCHOLESTEREMIA: ICD-10-CM

## 2020-05-14 DIAGNOSIS — R42 DIZZINESS: ICD-10-CM

## 2020-05-14 DIAGNOSIS — I10 ESSENTIAL HYPERTENSION: ICD-10-CM

## 2020-05-14 DIAGNOSIS — R60.0 BILATERAL LEG EDEMA: ICD-10-CM

## 2020-05-14 DIAGNOSIS — R00.2 PALPITATIONS: Primary | ICD-10-CM

## 2020-05-14 DIAGNOSIS — E83.42 HYPOMAGNESEMIA: ICD-10-CM

## 2020-05-14 DIAGNOSIS — E11.9 TYPE 2 DIABETES MELLITUS WITHOUT COMPLICATION, WITHOUT LONG-TERM CURRENT USE OF INSULIN (HCC): ICD-10-CM

## 2020-05-14 DIAGNOSIS — R06.02 SHORTNESS OF BREATH: ICD-10-CM

## 2020-05-14 DIAGNOSIS — D68.9 BLOOD CLOTTING DISORDER (HCC): ICD-10-CM

## 2020-05-14 DIAGNOSIS — E03.9 HYPOTHYROIDISM, UNSPECIFIED TYPE: ICD-10-CM

## 2020-05-14 DIAGNOSIS — Z86.2 H/O LUPUS ANTICOAGULANT DISORDER: ICD-10-CM

## 2020-05-14 DIAGNOSIS — R07.2 PRECORDIAL PAIN: ICD-10-CM

## 2020-05-14 DIAGNOSIS — E88.81 METABOLIC SYNDROME: ICD-10-CM

## 2020-05-14 PROBLEM — E88.810 METABOLIC SYNDROME: Status: ACTIVE | Noted: 2020-05-14

## 2020-05-14 PROBLEM — I25.10 CORONARY ARTERY DISEASE INVOLVING NATIVE CORONARY ARTERY OF NATIVE HEART WITHOUT ANGINA PECTORIS: Status: RESOLVED | Noted: 2019-06-27 | Resolved: 2020-05-14

## 2020-05-14 PROCEDURE — 99204 OFFICE O/P NEW MOD 45 MIN: CPT | Performed by: INTERNAL MEDICINE

## 2020-05-14 PROCEDURE — 93000 ELECTROCARDIOGRAM COMPLETE: CPT | Performed by: INTERNAL MEDICINE

## 2020-05-14 NOTE — PATIENT INSTRUCTIONS
Mediterranean Diet  A Mediterranean diet refers to food and lifestyle choices that are based on the traditions of countries located on the [place]. This way of eating has been shown to help prevent certain conditions and improve outcomes for people who have chronic diseases, like kidney disease and heart disease.  What are tips for following this plan?  Lifestyle  · Cook and eat meals together with your family, when possible.  · Drink enough fluid to keep your urine clear or pale yellow.  · Be physically active every day. This includes:  ? Aerobic exercise like running or swimming.  ? Leisure activities like gardening, walking, or housework.  · Get 7-8 hours of sleep each night.  · If recommended by your health care provider, drink red wine in moderation. This means 1 glass a day for nonpregnant women and 2 glasses a day for men. A glass of wine equals 5 oz (150 mL).  Reading food labels    · Check the serving size of packaged foods. For foods such as rice and pasta, the serving size refers to the amount of cooked product, not dry.  · Check the total fat in packaged foods. Avoid foods that have saturated fat or trans fats.  · Check the ingredients list for added sugars, such as corn syrup.  Shopping  · At the grocery store, buy most of your food from the areas near the walls of the store. This includes:  ? Fresh fruits and vegetables (produce).  ? Grains, beans, nuts, and seeds. Some of these may be available in unpackaged forms or large amounts (in bulk).  ? Fresh seafood.  ? Poultry and eggs.  ? Low-fat dairy products.  · Buy whole ingredients instead of prepackaged foods.  · Buy fresh fruits and vegetables in-season from local farmers markets.  · Buy frozen fruits and vegetables in resealable bags.  · If you do not have access to quality fresh seafood, buy precooked frozen shrimp or canned fish, such as tuna, salmon, or sardines.  · Buy small amounts of raw or cooked vegetables, salads, or olives from  the deli or salad bar at your store.  · Stock your pantry so you always have certain foods on hand, such as olive oil, canned tuna, canned tomatoes, rice, pasta, and beans.  Cooking  · Cook foods with extra-virgin olive oil instead of using butter or other vegetable oils.  · Have meat as a side dish, and have vegetables or grains as your main dish. This means having meat in small portions or adding small amounts of meat to foods like pasta or stew.  · Use beans or vegetables instead of meat in common dishes like chili or lasagna.  · [place] with different cooking methods. Try roasting or broiling vegetables instead of steaming or sautéeing them.  · Add frozen vegetables to soups, stews, pasta, or rice.  · Add nuts or seeds for added healthy fat at each meal. You can add these to yogurt, salads, or vegetable dishes.  · Marinate fish or vegetables using olive oil, lemon juice, garlic, and fresh herbs.  Meal planning    · Plan to eat 1 vegetarian meal one day each week. Try to work up to 2 vegetarian meals, if possible.  · Eat seafood 2 or more times a week.  · Have healthy snacks readily available, such as:  ? Vegetable sticks with hummus.  ? Greek yogurt.  ? Fruit and nut trail mix.  · Eat balanced meals throughout the week. This includes:  ? Fruit: 2-3 servings a day  ? Vegetables: 4-5 servings a day  ? Low-fat dairy: 2 servings a day  ? Fish, poultry, or lean meat: 1 serving a day  ? Beans and legumes: 2 or more servings a week  ? Nuts and seeds: 1-2 servings a day  ? Whole grains: 6-8 servings a day  ? Extra-virgin olive oil: 3-4 servings a day  · Limit red meat and sweets to only a few servings a month  What are my food choices?  · Mediterranean diet  ? Recommended  ? Grains: Whole-grain pasta. Brown rice. Bulgar wheat. Polenta. Couscous. Whole-wheat bread. Oatmeal. Quinoa.  ? Vegetables: Artichokes. Beets. Broccoli. Cabbage. Carrots. Eggplant. Green beans. Chard. Kale. Spinach. Onions. Leeks. Peas. Squash.  Tomatoes. Peppers. Radishes.  ? Fruits: Apples. Apricots. Avocado. Berries. Bananas. Cherries. Dates. Figs. Grapes. Alice. Melon. Oranges. Peaches. Plums. Pomegranate.  ? Meats and other protein foods: Beans. Almonds. Sunflower seeds. Pine nuts. Peanuts. Cod. [place]. Scallops. Shrimp. Tuna. Tilapia. Clams. Oysters. Eggs.  ? Dairy: Low-fat milk. Cheese. Greek yogurt.  ? Beverages: Water. Red wine. Herbal tea.  ? Fats and oils: Extra virgin olive oil. Avocado oil. Grape seed oil.  ? Sweets and desserts: Greek yogurt with honey. Baked apples. Poached pears. Trail mix.  ? Seasoning and other foods: Basil. Cilantro. Coriander. Cumin. Mint. Parsley. Rik. Rosemary. Tarragon. Garlic. Oregano. Thyme. Pepper. Balsalmic vinegar. Tahini. Hummus. Tomato sauce. Olives. Mushrooms.  ? Limit these  ? Grains: Prepackaged pasta or rice dishes. Prepackaged cereal with added sugar.  ? Vegetables: Deep fried potatoes (french fries).  ? Fruits: Fruit canned in syrup.  ? Meats and other protein foods: Beef. Pork. Lamb. Poultry with skin. Hot dogs. Ritchie.  ? Dairy: Ice cream. Sour cream. Whole milk.  ? Beverages: Juice. Sugar-sweetened soft drinks. Beer. Liquor and spirits.  ? Fats and oils: Butter. Canola oil. Vegetable oil. Beef fat (tallow). Lard.  ? Sweets and desserts: Cookies. Cakes. Pies. Candy.  ? Seasoning and other foods: Mayonnaise. Premade sauces and marinades.  ? The items listed may not be a complete list. Talk with your dietitian about what dietary choices are right for you.  Summary  · The Mediterranean diet includes both food and lifestyle choices.  · Eat a variety of fresh fruits and vegetables, beans, nuts, seeds, and whole grains.  · Limit the amount of red meat and sweets that you eat.  · Talk with your health care provider about whether it is safe for you to drink red wine in moderation. This means 1 glass a day for nonpregnant women and 2 glasses a day for men. A glass of wine equals 5 oz (150 mL).  This information  is not intended to replace advice given to you by your health care provider. Make sure you discuss any questions you have with your health care provider.  Document Released: 08/10/2017 Document Revised: 09/12/2017 Document Reviewed: 08/10/2017  [employer] Patient Education © 2020 [employer]

## 2020-05-14 NOTE — PROGRESS NOTES
"Chief Complaint   Patient presents with   • Establish Care     transfering from Dr Reyez, previously followed by Dr Gama prior to that. Requesting records.    • Edema     started about a week ago,   • Shortness of Breath     random episodes of SOB, last week reports her O2 sat being in the 80's at the time.    • Palpitations     reports having palpitations \" all day every day\".  DR Reyez had advised her to take an extra 1/2 tab of metoprolol but pt was afraid to due to pulse staying in the 50's.    • Dizziness     random episodes of dizziness   • Cardiac history     Dr Gama did cath several years ago \" 30 % blockage\", Dr Reyez did Echo and monitor prior to surgery last year. Not sure when a stress has been done.  Was told by one doctor she had Afib, was told by another she didn't.    • Labs     no routine labs for several months, reports glucose has been elevated, as high as 198.    • Chest Pain     mid chest, randomly occurs, causes her palpitations to be much worse.         CARDIAC COMPLAINTS  chest pressure/discomfort, dyspnea, Dizziness, fatigue, lower extremity edema and palpitations      Subjective   Paty Marie is a 59 y.o. female came in today for her initial cardiac evaluation.  She has history of hypertension, diabetes, hypothyroidism who also has been having problems in the form of palpitation for which she was seen by cardiologist at [place].  She has been having problems with palpitation for many years.  She was initially seen by Dr. Gama and underwent cardiac catheterization which showed nonobstructive disease involving the distal LAD.  She was then seen by an electrophysiologist also.  She was put on low-dose of beta-blockers with the advised to take extra 1 as needed.  She was diagnosed with cervical cancer and underwent surgery in November.  Prior to that she did undergo an echocardiogram and a Holter monitor.  After the surgery she started noticing the palpitation is occurring more often almost every " day.  And when it happens sometime it last for hours together.  She is afraid to take an extra dose of the metoprolol since she feels her heart rate is been running in the 50s most of the time.  She also has been noticing increasing shortness of breath for the last few days in the last few weeks she noticed her O2 saturation has dropped to 80% some time.  She also started noticing bilateral leg edema left more than the right.  She has been having episodes of dizziness which occurs randomly not related to any particular activities.  She also has been noticing chest pain.  The chest pain is pressure-like.  Feeling lasting for few minutes.  It occurs mostly when the palpitation gets prolonged.  She not sure when was the last time she had any labs done.  The one I have is from November of last year.  She also has history of clotting problems.  Apparently she was diagnosed with some kind of lupus anticoagulant antibodies.  She was put on high dose of aspirin.  She has been noticing increasing bruises since then.  She also used to be a smoker of who quit in 2018.  She has gained about 45 pounds since then.  She does have family history of diabetes, ischemic heart disease stroke as well as 1 of her sister has atrial fibrillation.    Past Surgical History:   Procedure Laterality Date   • CARDIAC CATHETERIZATION  02/23/2010    Dr. Gama- 30% Distal LAD   • CONVERTED (HISTORICAL) HOLTER  10/28/2019    - AVG 73. . Rare PAC   • ECHO - CONVERTED  05/23/2019    Dr. Reyez- EF 55%. LA- 3.9 Cm. Mild MR. RVSP- 33 mmHg.   • LAPAROSCOPIC TUBAL LIGATION     • NECK SURGERY     • NODE DISSECTION LAPAROSCOPIC N/A 11/22/2019    STAGING WITH DAVINCI ROBOT WITH LYMPH NODE DISSECTION;  Surgeon: Jennifer Holman MD;  Location: [facility];  Service: [facility]   • SKIN CANCER EXCISION     • TOTAL LAPAROSCOPIC HYSTERECTOMY SALPINGO OOPHORECTOMY Bilateral 8/30/2019    TOTAL LAPAROSCOPIC HYSTERECTOMY BILATERAL SALPINGOOPHORECTOMY WITH  TONI ROBOT, BIOPSIES OF LEFT VULVAR SKIN LESIONS;  Surgeon: Jennifer Holman MD;  Location: [facility];  Service: [facility]       Current Outpatient Medications   Medication Sig Dispense Refill   • atorvastatin (LIPITOR) 40 MG tablet Take 40 mg by mouth Daily.     • citalopram (CeleXA) 40 MG tablet      • clonazePAM (KlonoPIN) 0.5 MG tablet Take 0.5 mg by mouth 3 (Three) Times a Day As Needed for Seizures.     • docusate sodium (COLACE) 100 MG capsule Take 1 capsule by mouth 2 (Two) Times a Day. 180 capsule 3   • hydroCHLOROthiazide (MICROZIDE) 12.5 MG capsule Every Morning.  2   • HYDROcodone-acetaminophen (NORCO)  MG per tablet TK 1 T PO  TID PRN  0   • levothyroxine (SYNTHROID, LEVOTHROID) 50 MCG tablet Take 50 mcg by mouth Daily.     • lisinopril (PRINIVIL,ZESTRIL) 10 MG tablet Take 10 mg by mouth Daily.     • metFORMIN ER (GLUCOPHAGE-XR) 500 MG 24 hr tablet Take 500 mg by mouth 2 (Two) Times a Day.     • methocarbamol (ROBAXIN) 500 MG tablet TK 1 T PO  TID PRN  5   • metoprolol tartrate (LOPRESSOR) 25 MG tablet Take 1 tablet by mouth. Reduce it to 1/2 tab bid     • pantoprazole (PROTONIX) 40 MG EC tablet Take 40 mg by mouth Daily.  2   • potassium chloride (K-DUR) 10 MEQ CR tablet Take 10 mEq by mouth 2 (Two) Times a Day.     • silver sulfadiazine (SILVADENE) 1 % cream Apply  topically to the appropriate area as directed 2 (Two) Times a Day. 25 g 1   • traZODone (DESYREL) 50 MG tablet TK 1/2 TO 1 T PO QHS  1   • aspirin 81 MG tablet Take 1 tablet by mouth Daily. 30 tablet 11     No current facility-administered medications for this visit.            ALLERGIES:  Erythromycin; Contrast dye; and Zofran [ondansetron hcl]    Past Medical History:   Diagnosis Date   • Arrhythmia    • Arthritis    • Blood clotting disorder (CMS/HCC)    • Cancer (CMS/HCC)    • Coronary artery disease    • Diabetes (CMS/HCC)    • Diastolic dysfunction    • History of brachytherapy 02/12/2020    vaginal brachytherapy   •  Hyperlipidemia    • Hypertension    • Lupus anticoagulant disorder (CMS/HCC)    • PONV (postoperative nausea and vomiting)        Social History     Tobacco Use   Smoking Status Former Smoker   • Types: Electronic Cigarette   • Last attempt to quit: 2018   • Years since quittin.3   Smokeless Tobacco Never Used   Tobacco Comment    rarely vapes now          Family History   Problem Relation Age of Onset   • Stroke Mother    • Hypertension Mother    • Diabetes Mother    • Heart attack Mother    • Heart attack Father    • Stroke Father    • Heart disease Father    • Diabetes Father    • Atrial fibrillation Sister    • Heart attack Brother    • Other Other    • Heart disease Sister         CABG 3 times   • No Known Problems Sister    • No Known Problems Sister    • Heart attack Brother    • Heart attack Brother    • Heart attack Brother    • No Known Problems Son    • No Known Problems Daughter        Review of Systems   Constitution: Positive for malaise/fatigue and weight gain. Negative for decreased appetite.   HENT: Negative for congestion and sore throat.    Eyes: Negative for blurred vision.   Cardiovascular: Positive for chest pain, dyspnea on exertion, leg swelling and palpitations.   Respiratory: Positive for shortness of breath. Negative for snoring.    Endocrine: Negative for cold intolerance and heat intolerance.   Hematologic/Lymphatic: Negative for adenopathy. Bruises/bleeds easily.   Skin: Negative for itching, nail changes and skin cancer.   Musculoskeletal: Negative for arthritis and myalgias.   Gastrointestinal: Negative for abdominal pain, dysphagia and heartburn.   Genitourinary: Negative for bladder incontinence and frequency.   Neurological: Negative for dizziness, light-headedness, seizures and vertigo.   Psychiatric/Behavioral: Negative for altered mental status.   Allergic/Immunologic: Negative for environmental allergies and hives.       Diabetes- Yes  Thyroid- abnormal    Objective  "    /90 (BP Location: Left arm)   Pulse 53   Temp 97.8 °F (36.6 °C)   Ht 165.1 cm (65\")   Wt 91.2 kg (201 lb)   BMI 33.45 kg/m²     Physical Exam   Constitutional: She is oriented to person, place, and time. She appears well-developed and well-nourished.   HENT:   Head: Normocephalic.   Nose: Nose normal.   Eyes: Pupils are equal, round, and reactive to light. EOM are normal.   Neck: Normal range of motion. Neck supple.   Cardiovascular: Normal rate, regular rhythm, S1 normal and S2 normal.   Murmur heard.  Pulmonary/Chest: Effort normal and breath sounds normal.   Abdominal: Soft. Bowel sounds are normal.   Musculoskeletal: Normal range of motion. She exhibits edema.   Neurological: She is alert and oriented to person, place, and time.   Skin: Skin is warm and dry.   Psychiatric: She has a normal mood and affect.         ECG 12 Lead  Date/Time: 5/14/2020 1:43 PM  Performed by: Mason Lassiter MD  Authorized by: Mason Lassiter MD   Comparison: compared with previous ECG from 8/30/2019  Similar to previous ECG  Rhythm: sinus tachycardia  Rate: normal  QRS axis: normal    Clinical impression: non-specific ECG              Assessment/Plan   Patient's Body mass index is 33.45 kg/m². BMI is above normal parameters. Recommendations include: educational material, exercise counseling and nutrition counseling.     Paty was seen today for establish care, edema, shortness of breath, palpitations, dizziness, cardiac history, labs and chest pain.    Diagnoses and all orders for this visit:    Palpitations  -     Stress Test With Myocardial Perfusion One Day; Future  -     TSH; Future    Essential hypertension  -     Comprehensive Metabolic Panel; Future    Type 2 diabetes mellitus without complication, without long-term current use of insulin (CMS/[facility])  -     Hemoglobin A1c; Future    Hypercholesteremia  -     Lipid Panel; Future    Hypothyroidism, unspecified type  -     TSH; Future    Dizziness  -     " CBC & Differential; Future    Precordial pain  -     Stress Test With Myocardial Perfusion One Day; Future  -     High Sensitivity CRP; Future    Metabolic syndrome    Hypomagnesemia  -     Magnesium; Future    Shortness of breath  -     Adult Transthoracic Echo Complete W/ Cont if Necessary Per Protocol; Future  -     BNP; Future  -     D-dimer, Quantitative; Future    Bilateral leg edema  -     Adult Transthoracic Echo Complete W/ Cont if Necessary Per Protocol; Future  -     BNP; Future  -     D-dimer, Quantitative; Future    H/O lupus anticoagulant disorder  -     Protime-INR; Future  -     Sedimentation Rate; Future  -     Rheumatoid Factor; Future  -     SERGIO; Future    Blood clotting disorder (CMS/HCC)    On clinical examination she is bradycardic but regular.  Her blood pressure is mildly elevated.  Her EKG shows sinus bradycardia with short RI interval.  She does have nonspecific ST-T changes.  Her clinical examination reveals a BMI of 33.  Her clinical examination reveals bradycardia, short systolic murmur at the mitral area.  She does have 1+ pedal edema.    Her major problem seems to be the palpitation.  Her work-up in the past has shown more of a PACs rather than any PSVT or PAF.  I am not sure at this time whether this could be related to her bradycardia itself.  I advised her to reduce the dose of metoprolol to half a tablet twice a day.  I advised her to undergo some lab work including TSH and magnesium level.  She is also advised to undergo repeat echocardiogram to reevaluate the LV function, valvular structures, LA size as well as to look for any pulmonary hypertension.  I also advised her to undergo a stress test in the form of modified Braulio to evaluate her functional status, chronotropic response, blood pressure response and also rule out any stress-induced arrhythmia or ischemia.  If there is no evidence of ischemia and if the EF is normal then will consider an 1C antiarrhythmic  medication.    Regarding his chest pain, it appears to be atypical.  It could be related to the palpitation itself.  I did advise her to check a CRP level.  The stress test will give us an idea whether this is ischemic or not.    Regarding the shortness of breath, it is most probably secondary to her history of smoking and as well as the weight gain.  I did talk to her for a long time about diet and weight reduction.  I gave her papers on Mediterranean diet.  I did advise her to get an echocardiogram to evaluate the LV function, valvular structures as well as the PA pressure.  She is also advised to check a BNP level and d-dimer level.  If the d-dimer is elevated she may need further work-up including CTA of the chest or VQ scan    Regarding the leg edema, it could be secondary to her pulmonary pretension.  We will get a d-dimer level and the BNP level to rule out DVT as well as heart failure.    Regarding the lupus anticoagulant disorder, I did advise her to reduce the dose of aspirin from 325 to 81 mg which might help reducing the bruises.    Regarding the hypertension, at this time continue the ACE inhibitor's and the low-dose of diuretic along with the lower dose of beta-blocker    Regarding the diabetes and the hypothyroidism, she is advised to talk to you about the dosage    Based on the results, further recommendations will be made.             Electronically signed by Mason Lassiter MD May 14, 2020 13:21

## 2020-05-19 ENCOUNTER — LAB (OUTPATIENT)
Dept: LAB | Facility: HOSPITAL | Age: 60
End: 2020-05-19

## 2020-05-19 ENCOUNTER — CLINICAL SUPPORT (OUTPATIENT)
Dept: GYNECOLOGIC ONCOLOGY | Facility: CLINIC | Age: 60
End: 2020-05-19

## 2020-05-19 VITALS
DIASTOLIC BLOOD PRESSURE: 72 MMHG | RESPIRATION RATE: 14 BRPM | HEART RATE: 59 BPM | TEMPERATURE: 97.8 F | BODY MASS INDEX: 33.12 KG/M2 | WEIGHT: 199 LBS | OXYGEN SATURATION: 98 % | SYSTOLIC BLOOD PRESSURE: 144 MMHG

## 2020-05-19 DIAGNOSIS — R60.0 BILATERAL LEG EDEMA: ICD-10-CM

## 2020-05-19 DIAGNOSIS — F41.9 ANXIETY AND DEPRESSION: ICD-10-CM

## 2020-05-19 DIAGNOSIS — C54.1 ENDOMETRIAL CANCER (HCC): Primary | ICD-10-CM

## 2020-05-19 DIAGNOSIS — R53.83 FATIGUE, UNSPECIFIED TYPE: ICD-10-CM

## 2020-05-19 DIAGNOSIS — R11.0 NAUSEA: ICD-10-CM

## 2020-05-19 DIAGNOSIS — F32.A ANXIETY AND DEPRESSION: ICD-10-CM

## 2020-05-19 DIAGNOSIS — L90.0 LICHEN SCLEROSUS: ICD-10-CM

## 2020-05-19 DIAGNOSIS — R10.13 EPIGASTRIC PAIN: ICD-10-CM

## 2020-05-19 PROBLEM — M81.0 OP (OSTEOPOROSIS): Status: ACTIVE | Noted: 2020-05-19

## 2020-05-19 PROBLEM — N85.02 COMPLEX ATYPICAL ENDOMETRIAL HYPERPLASIA: Status: RESOLVED | Noted: 2019-06-26 | Resolved: 2020-05-19

## 2020-05-19 LAB
25(OH)D3 SERPL-MCNC: 27.3 NG/ML (ref 30–100)
ALBUMIN SERPL-MCNC: 4.2 G/DL (ref 3.5–5.2)
ALBUMIN/GLOB SERPL: 1.8 G/DL
ALP SERPL-CCNC: 63 U/L (ref 39–117)
ALT SERPL W P-5'-P-CCNC: 19 U/L (ref 1–33)
ANION GAP SERPL CALCULATED.3IONS-SCNC: 13 MMOL/L (ref 5–15)
AST SERPL-CCNC: 20 U/L (ref 1–32)
BILIRUB SERPL-MCNC: 0.2 MG/DL (ref 0.2–1.2)
BUN BLD-MCNC: 11 MG/DL (ref 6–20)
BUN/CREAT SERPL: 13.9 (ref 7–25)
CALCIUM SPEC-SCNC: 9 MG/DL (ref 8.6–10.5)
CHLORIDE SERPL-SCNC: 105 MMOL/L (ref 98–107)
CO2 SERPL-SCNC: 26 MMOL/L (ref 22–29)
CREAT BLD-MCNC: 0.79 MG/DL (ref 0.57–1)
ERYTHROCYTE [DISTWIDTH] IN BLOOD BY AUTOMATED COUNT: 16.3 % (ref 12.3–15.4)
GFR SERPL CREATININE-BSD FRML MDRD: 75 ML/MIN/1.73
GLOBULIN UR ELPH-MCNC: 2.4 GM/DL
GLUCOSE BLD-MCNC: 112 MG/DL (ref 65–99)
HBA1C MFR BLD: 6.4 % (ref 4.8–5.6)
HCT VFR BLD AUTO: 41.4 % (ref 34–46.6)
HGB BLD-MCNC: 12.8 G/DL (ref 12–15.9)
LYMPHOCYTES # BLD AUTO: 1.5 10*3/MM3 (ref 0.7–3.1)
LYMPHOCYTES NFR BLD AUTO: 23.3 % (ref 19.6–45.3)
MCH RBC QN AUTO: 27.9 PG (ref 26.6–33)
MCHC RBC AUTO-ENTMCNC: 31 G/DL (ref 31.5–35.7)
MCV RBC AUTO: 89.9 FL (ref 79–97)
MONOCYTES # BLD AUTO: 0.6 10*3/MM3 (ref 0.1–0.9)
MONOCYTES NFR BLD AUTO: 8.7 % (ref 5–12)
NEUTROPHILS # BLD AUTO: 4.5 10*3/MM3 (ref 1.7–7)
NEUTROPHILS NFR BLD AUTO: 68 % (ref 42.7–76)
PLATELET # BLD AUTO: 374 10*3/MM3 (ref 140–450)
PMV BLD AUTO: 8.4 FL (ref 6–12)
POTASSIUM BLD-SCNC: 4.2 MMOL/L (ref 3.5–5.2)
PROT SERPL-MCNC: 6.6 G/DL (ref 6–8.5)
RBC # BLD AUTO: 4.6 10*6/MM3 (ref 3.77–5.28)
SODIUM BLD-SCNC: 144 MMOL/L (ref 136–145)
TSH SERPL DL<=0.05 MIU/L-ACNC: 5.09 UIU/ML (ref 0.27–4.2)
VIT B12 BLD-MCNC: 251 PG/ML (ref 211–946)
WBC NRBC COR # BLD: 6.6 10*3/MM3 (ref 3.4–10.8)

## 2020-05-19 PROCEDURE — 80050 GENERAL HEALTH PANEL: CPT

## 2020-05-19 PROCEDURE — 82607 VITAMIN B-12: CPT

## 2020-05-19 PROCEDURE — 99215 OFFICE O/P EST HI 40 MIN: CPT | Performed by: NURSE PRACTITIONER

## 2020-05-19 PROCEDURE — 36415 COLL VENOUS BLD VENIPUNCTURE: CPT

## 2020-05-19 PROCEDURE — 83036 HEMOGLOBIN GLYCOSYLATED A1C: CPT

## 2020-05-19 PROCEDURE — 82306 VITAMIN D 25 HYDROXY: CPT

## 2020-05-19 NOTE — PROGRESS NOTES
"GYN ONCOLOGY CANCER SURVIVORSHIP VISIT    Paty Marie  [id number]  1960    Chief Complaint: Follow-up ([facility])        History of present illness:  Paty Marie is a 59 y.o. year old female who is here today for her Cancer Survivorship visit, see oncology history below. She presents with multiple concerns:  - Patient c/o feeling poorly overall. She describes frequent fatigue. She reports little interest in doing things throughout the day and tires easily. This is complicated by her chronic low back pain, rates 6/10 today, states this is her baseline. Patient is unsure of last time she has had routine blood work with her PCP. She is scheduled for cardiac stress test in [place], [place] next month.   - She c/o \"some kind of pain\" and points to epigastric region. She describes this as \"attacks\" of pain with associated nausea and some reflux. She denies vomiting. She has not correlated the pain with meals or particular foods.  - She c/o bilateral lower leg swelling since her last surgery, left worst than the right. She notes swelling worsens throughout the day.  - Patient c/o \"rough patch\" to right external vagina with chronic vaginal dryness. She states she was diagnosed with lichen sclerosus by previous gyn provider and prescribed topical steroid cream that helps.   - Lastly, patient c/o moodiness/tearfulness since her surgeries. She understands that she is not a candidate for HRT. She describes a history of anxiety and depression x 20+ years. She has been on PRN klonopin X 20 years and daily Celexa x 7 years. She has seen a therapist previously, but is interested in seeing someone else. She lives in [place], [place] and does not drive due to \"nerves\". She would like to see someone here if visits can be coordinated or if they can be done through telemedicine.      Cancer History:      Endometrial cancer (CMS/HCC)    6/26/2019 Biopsy     EMB by Dr. Juju Chadwick for postmenopausal bleeding with ultrasound showing " endometrium thickened to 8.8 mm.   Pathology showed complex atypical hyperplasia. Referred to Gyn Oncology      8/30/2019 Surgery     RTLH/BSO and left vulvar biopsies.     Pathology showed 4.0 x 3.0 x 1.0 cm grade 1 endometrioid adenocarcinoma, invasive into 11/18 mm myometrium (>50%). Vulvar biopsies benign. Lymphvascular space invasion positive. MSI testing normal.       9/19/2019 Imaging     CT abdomen pelvis negative for metastatic disease      11/22/2019 Surgery     Lymph node dissection and staging with robot. All nodes negative.   Final Stage IB grade 1      2/3/2020 - 2/12/2020 Radiation     Radiation OncologyTreatment Course:  Paty Marie received 3000 cGy in 5 fractions to vagina via High Dose Radiation - HDR.      5/19/2020 Survivorship     Survivorship Care Plan completed and discussed with patient.  Copy of Survivorship Care Plan provided to patient and primary care provider.         Past Medical History:   Diagnosis Date   • Arrhythmia    • Arthritis    • Blood clotting disorder (CMS/HCC)    • Coronary artery disease    • Diabetes (CMS/HCC)    • Diastolic dysfunction    • Endometrial cancer (CMS/HCC) 9/19/2019   • History of brachytherapy 02/12/2020    vaginal brachytherapy   • Hyperlipidemia    • Hypertension    • Lupus anticoagulant disorder (CMS/HCC)    • PONV (postoperative nausea and vomiting)        Past Surgical History:   Procedure Laterality Date   • CARDIAC CATHETERIZATION  02/23/2010    Dr. Gama- 30% Distal LAD   • CONVERTED (HISTORICAL) HOLTER  10/28/2019    - AVG 73. . Rare PAC   • ECHO - CONVERTED  05/23/2019    Dr. Reyez- EF 55%. LA- 3.9 Cm. Mild MR. RVSP- 33 mmHg.   • LAPAROSCOPIC TUBAL LIGATION     • NECK SURGERY     • NODE DISSECTION LAPAROSCOPIC N/A 11/22/2019    STAGING WITH DAVINCI ROBOT WITH LYMPH NODE DISSECTION;  Surgeon: Jennifer Holman MD;  Location: On license of [facility];  Service: [facility]   • SKIN CANCER EXCISION     • TOTAL LAPAROSCOPIC HYSTERECTOMY SALPINGO  OOPHORECTOMY Bilateral 8/30/2019    TOTAL LAPAROSCOPIC HYSTERECTOMY BILATERAL SALPINGOOPHORECTOMY WITH DAVINCI ROBOT, BIOPSIES OF LEFT VULVAR SKIN LESIONS;  Surgeon: Jennifer Holman MD;  Location: [facility];  [facility]       MEDICATIONS: The current medication list was reviewed and reconciled.     Allergies:  is allergic to erythromycin; contrast dye; and zofran [ondansetron hcl].    Family History   Problem Relation Age of Onset   • Stroke Mother    • Hypertension Mother    • Diabetes Mother    • Heart attack Mother    • Heart attack Father    • Stroke Father    • Heart disease Father    • Diabetes Father    • Atrial fibrillation Sister    • Heart attack Brother    • Lymphoma Brother    • Other Other    • Heart disease Sister         CABG 3 times   • No Known Problems Sister    • No Known Problems Sister    • Heart attack Brother    • Heart attack Brother    • Heart attack Brother    • No Known Problems Son    • No Known Problems Daughter        Last imaging study was CT abdomen pelvis 9/19/2019.     Review of Systems   Constitutional: Positive for fatigue. Negative for fever and unexpected weight change.   HENT: Negative for congestion, ear pain, hearing loss, sinus pressure and trouble swallowing.    Eyes: Negative for visual disturbance.   Respiratory: Negative for cough, chest tightness, shortness of breath and wheezing.    Cardiovascular: Positive for palpitations (seeing cardio) and leg swelling (L>R). Negative for chest pain.   Gastrointestinal: Positive for abdominal pain (epigastric and LUQ) and nausea (with occ reflux). Negative for abdominal distention, constipation, diarrhea and vomiting.   Endocrine: Negative for cold intolerance, heat intolerance, polydipsia, polyphagia and polyuria.   Genitourinary: Positive for genital sores (lichen sclerosus, rough place to right vulva). Negative for difficulty urinating, dyspareunia, dysuria, frequency, hematuria, pelvic pain, urgency, vaginal bleeding,  vaginal discharge and vaginal pain.   Musculoskeletal: Positive for arthralgias and back pain ( chronic). Negative for gait problem, joint swelling and myalgias.   Skin: Negative for color change, pallor and rash.   Neurological: Positive for weakness (bilateral legs). Negative for dizziness, seizures, syncope, light-headedness, numbness and headaches.   Hematological: Negative for adenopathy. Does not bruise/bleed easily.   Psychiatric/Behavioral: Positive for dysphoric mood. Negative for agitation, confusion, sleep disturbance and suicidal ideas. The patient is nervous/anxious.          Physical Exam  Vital Signs: /72   Pulse 59   Temp 97.8 °F (36.6 °C) (Temporal)   Resp 14   Wt 90.3 kg (199 lb)   SpO2 98%   BMI 33.12 kg/m²   Vitals:    05/19/20 1156   PainSc:   6   PainLoc: Back  Comment: chronic           General Appearance:  alert, cooperative, no apparent distress, appears stated age and obese   Neurologic/Psychiatric: A&O x 3, gait steady, appropriate affect   HEENT:  Normocephalic, without obvious abnormality, mucous membranes moist   Neck: Supple, symmetrical, trachea midline, no adenopathy;  No thyromegaly, masses, or tenderness   Back:   Symmetric, no curvature, ROM normal, no CVA tenderness   Lungs:   Clear to auscultation bilaterally; respirations regular, even, and unlabored bilaterally   Heart:  Regular rate and rhythm, no murmurs appreciated   Breasts:  deferred   Abdomen:   Soft, non-distended, no organomegaly, tenderness in epigastric and LUQ, Exam limited d/t habitus. and all incisions well healed   Lymph nodes: No cervical, supraclavicular, inguinal adenopathy noted   Extremities: Normal, atraumatic; no clubbing, cyanosis. BLE edema, left>right, 2+ pitting edema in left ankle    Pelvic: External Genitalia  atrophy, lichen sclerosus changes in classic figure 8 pattern with labial fusion noted. No suspicious lesions  Vagina  is pale, atrophic.  and changes consistent with previous  radiation.  Vaginal Cuff  Female Vaginal Cuff: smooth, intact, without visible lesions and changes consistent with previous radiation  Uterus  surgically absent and no palpable masses  Ovaries  surgically absent bilaterally  Parametria  smooth  Rectovaginal  Female rectovaginal: deferred     ECOG Performance Status: (1) Restricted in Physically Strenuous Activity, Ambulatory & Able to Do Work of Light Nature      Survivorship Needs Assessment:  PT/Rehab  Health history, treatment course, and current status. The patient may be in need of physical therapy or rehabilitation services for lymphedema management. We discussed starting with conservative management first: compression stocking, elevation, etc. If conservative measures not effective, will refer to PT.     Behavioral Health  A post-treatment depression screening has been completed. PHQ-9 results show 10-14 (Moderate Depression). The patient has previously established mental health care. A referral is recommended at this time. The patient is in need of [facility]. Patient accepting, referral ordered.       Procedure Note:  No notes on file      Assessment and Plan:  Paty was seen today for follow-up.    Diagnoses and all orders for this visit:    Endometrial cancer (CMS/HCC)    Anxiety and depression  -     Ambulatory Referral to Psychotherapy    Fatigue, unspecified type  -     CBC & Differential; Future  -     Comprehensive Metabolic Panel; Future  -     TSH; Future  -     Vitamin D 25 Hydroxy; Future  -     Hemoglobin A1c; Future  -     Vitamin B12; Future    Epigastric pain    Nausea    Bilateral leg edema    Lichen sclerosus          There is no evidence of disease upon today's exam. We discussed plan for every 3 month cancer surveillance for the first 2 years, then every 6 months until the 5 year radha.     The patient and I have reviewed her Survivorship Care Plan in detail. We discussed her diagnosis, pathology, histology, all treatments, and  "ongoing surveillance recommendations. All questions were answered to her satisfaction. She is in agreement with our plan for ongoing surveillance as outlined in her plan. A copy of this document was provided to her at the completion of our visit.  A copy has also been sent to her primary care provider.    Pain assessment was performed today as a part of patient’s care. For patients with pain related to surgery, gynecologic malignancy or cancer treatment, the plan is as noted in the assessment/plan.  For patients with pain not related to these issues, they are to seek any further needed care from a more appropriate provider, such as PCP.  aPty Marie reports a pain score of 6.  Given her pain assessment as noted, treatment options were discussed and the following options were decided upon as a follow-up plan to address the patient's pain: continuation of current treatment plan for pain.    Additional problems discussion and planning:  - Recommended referral to [facility] for therapy and medication management. Patient has long-standing history of anxiety and depression, will need ongoing care. She agrees. Referral ordered.   - Discussed generalized fatigue, explained this is not uncommon following hysterectomy and can last several months. Also discussed that anxiety and depression can lead to feeling fatigued and disinterest in normal activities. We will obtain panel of blood work today to rule out other underlying problems that could lead to fatigue such as anemia, thyroid dysfunction, vitamin deficiencies, etc. She will be notified of all results upon their return. Abnormalities will be treated accordingly or she will be referred to her PCP for management.   - Discussed epigastric pain \"attacks\" and intermittent nausea. Her symptoms are all localized to the upper abdomen and unlikely related to her gynecologic history. Will obtain metabolic panel with other labs today. If CMP normal, will likely refer to GI " for further evaluation and management. Patient encouraged to keep a food log and symptom log to see if she can correlate.   - Discussed BLE edema, left worse than right. There is no evidence of DVT, symptoms most consistent with lymphedema. Discussed this process and management. We will being with conservative measures, if not effective will refer to PT. She v/u.   - Lastly, discussed h/o lichen sclerosus. No suspicious lesions today, exam consistent with this classic inflammatory skin condition. Patient encouraged to continue home topical steroids as previously prescribed.    This was a 60 minute direct face to face visit with 20 minutes spent in review of her Survivorship Care Plan, 10 minutes in physical exam, and an additional 30 minutes spent in discussion coordination of care for multiple other concerns at outlined above. Modifier 25 applies to today's visit.      Return to clinic in 3 months for ongoing cancer surveillance.      Arleth Chung, APRN

## 2020-05-19 NOTE — PATIENT INSTRUCTIONS
You will need to purchase compression socks or stockings. You may purchase thigh highs if you need to.     These can purchased online at any medical supply store or amaz[employer].

## 2020-05-20 ENCOUNTER — TELEPHONE (OUTPATIENT)
Dept: GYNECOLOGIC ONCOLOGY | Facility: CLINIC | Age: 60
End: 2020-05-20

## 2020-05-20 DIAGNOSIS — R10.13 EPIGASTRIC PAIN: Primary | ICD-10-CM

## 2020-05-20 DIAGNOSIS — R11.0 NAUSEA: ICD-10-CM

## 2020-05-20 NOTE — TELEPHONE ENCOUNTER
Called patient and reviewed lab results. Patient took notes during our discussion regarding results and instructions. Good news: blood counts, kidney and liver functions normal.   Needs work: 1) Vitamin D low. Instructed to begin Vit D3 8367-1961 IU OTC daily.  2) TSH elevated. Patient has history of hypothyroidism, on 50 mcg daily synthroid for many years. She needs to follow-up with PCP for likely dose adjustment and management. 3) Hgb A1C remains on the verge of diabetes. She currently takes Metformin BID. Instructed this may not be enough and she needs to follow low carb diet closely. Also needs to follow-up with PCP regarding this. She v/u.   Explained to patient that vitamin D deficiency, hypothyroidism, and diabetes can all lead to fatigue. If these are corrected she may begin to feel better day to day. Patient will call PCP for follow-up today and I will send a copy of labs.   As CMP normal, I am going to refer to GI for further evaluation and management of epigastric pain and nausea. She v/u. Referral ordered.

## 2020-06-04 ENCOUNTER — APPOINTMENT (OUTPATIENT)
Dept: CARDIOLOGY | Facility: HOSPITAL | Age: 60
End: 2020-06-04

## 2020-06-18 ENCOUNTER — TELEPHONE (OUTPATIENT)
Dept: GYNECOLOGIC ONCOLOGY | Facility: CLINIC | Age: 60
End: 2020-06-18

## 2020-06-18 NOTE — TELEPHONE ENCOUNTER
I called pt back.  The lymphedema in her ble's is painful.  She has started wearing a compression garment and this helps but only while she is wearing it.  She is interested in seeing a provider that specializes in lymphedema who practices in [place] but forgot the person's name.  She is going to call me back when she gets the name.

## 2020-06-18 NOTE — TELEPHONE ENCOUNTER
Patient would like to speak to Arleth's nurse.  She has a couple of questions about a problem she is having with her legs.      [phone]

## 2020-06-24 ENCOUNTER — TELEPHONE (OUTPATIENT)
Dept: GYNECOLOGIC ONCOLOGY | Facility: CLINIC | Age: 60
End: 2020-06-24

## 2020-06-24 DIAGNOSIS — R60.0 BILATERAL LEG EDEMA: Primary | ICD-10-CM

## 2020-06-24 NOTE — TELEPHONE ENCOUNTER
Pt asked for Dr. Holman or a nurse to call back.    Pt gave the wrong number for a referral to doctor's office and would like to compare numbers.    Phone #:  [phone]

## 2020-06-26 ENCOUNTER — CONSULT (OUTPATIENT)
Dept: GASTROENTEROLOGY | Facility: CLINIC | Age: 60
End: 2020-06-26

## 2020-06-26 ENCOUNTER — TRANSCRIBE ORDERS (OUTPATIENT)
Dept: GYNECOLOGIC ONCOLOGY | Facility: CLINIC | Age: 60
End: 2020-06-26

## 2020-06-26 VITALS
TEMPERATURE: 97 F | BODY MASS INDEX: 32.76 KG/M2 | SYSTOLIC BLOOD PRESSURE: 135 MMHG | WEIGHT: 196.6 LBS | HEIGHT: 65 IN | DIASTOLIC BLOOD PRESSURE: 84 MMHG | OXYGEN SATURATION: 96 % | HEART RATE: 72 BPM

## 2020-06-26 DIAGNOSIS — R11.0 NAUSEA: ICD-10-CM

## 2020-06-26 DIAGNOSIS — K21.9 GASTROESOPHAGEAL REFLUX DISEASE, ESOPHAGITIS PRESENCE NOT SPECIFIED: ICD-10-CM

## 2020-06-26 DIAGNOSIS — R10.13 EPIGASTRIC PAIN: Primary | ICD-10-CM

## 2020-06-26 DIAGNOSIS — E55.9 VITAMIN D DEFICIENCY: ICD-10-CM

## 2020-06-26 DIAGNOSIS — E03.9 HYPOTHYROIDISM, UNSPECIFIED TYPE: ICD-10-CM

## 2020-06-26 DIAGNOSIS — R60.0 LOWER EXTREMITY EDEMA: Primary | ICD-10-CM

## 2020-06-26 PROCEDURE — 86677 HELICOBACTER PYLORI ANTIBODY: CPT | Performed by: PHYSICIAN ASSISTANT

## 2020-06-26 PROCEDURE — 82652 VIT D 1 25-DIHYDROXY: CPT | Performed by: PHYSICIAN ASSISTANT

## 2020-06-26 PROCEDURE — 99243 OFF/OP CNSLTJ NEW/EST LOW 30: CPT | Performed by: PHYSICIAN ASSISTANT

## 2020-06-26 PROCEDURE — 84439 ASSAY OF FREE THYROXINE: CPT | Performed by: PHYSICIAN ASSISTANT

## 2020-06-26 PROCEDURE — 84443 ASSAY THYROID STIM HORMONE: CPT | Performed by: PHYSICIAN ASSISTANT

## 2020-06-27 LAB
T4 FREE SERPL-MCNC: 1.35 NG/DL (ref 0.93–1.7)
TSH SERPL DL<=0.05 MIU/L-ACNC: 2.21 UIU/ML (ref 0.27–4.2)

## 2020-06-29 LAB
H PYLORI IGA SER IA-ACNC: <9 UNITS (ref 0–8.9)
H PYLORI IGG SER IA-ACNC: 5.78 INDEX VALUE (ref 0–0.79)
H PYLORI IGM SER-ACNC: <9 UNITS (ref 0–8.9)

## 2020-06-30 LAB — 1,25(OH)2D3 SERPL-MCNC: 12 PG/ML (ref 19.9–79.3)

## 2020-07-15 ENCOUNTER — APPOINTMENT (OUTPATIENT)
Dept: CARDIOLOGY | Facility: HOSPITAL | Age: 60
End: 2020-07-15

## 2020-07-16 ENCOUNTER — APPOINTMENT (OUTPATIENT)
Dept: OCCUPATIONAL THERAPY | Facility: HOSPITAL | Age: 60
End: 2020-07-16

## 2020-07-16 ENCOUNTER — APPOINTMENT (OUTPATIENT)
Dept: ULTRASOUND IMAGING | Facility: HOSPITAL | Age: 60
End: 2020-07-16

## 2020-07-17 ENCOUNTER — APPOINTMENT (OUTPATIENT)
Dept: OCCUPATIONAL THERAPY | Facility: HOSPITAL | Age: 60
End: 2020-07-17

## 2020-07-21 ENCOUNTER — APPOINTMENT (OUTPATIENT)
Dept: ULTRASOUND IMAGING | Facility: HOSPITAL | Age: 60
End: 2020-07-21

## 2020-07-24 ENCOUNTER — HOSPITAL ENCOUNTER (OUTPATIENT)
Dept: OCCUPATIONAL THERAPY | Facility: HOSPITAL | Age: 60
Setting detail: THERAPIES SERIES
Discharge: HOME | End: 2020-07-24

## 2020-07-24 DIAGNOSIS — C54.1 ENDOMETRIAL CANCER (HCC): ICD-10-CM

## 2020-07-24 DIAGNOSIS — E11.9 TYPE 2 DIABETES MELLITUS WITHOUT COMPLICATION, WITHOUT LONG-TERM CURRENT USE OF INSULIN (HCC): ICD-10-CM

## 2020-07-24 DIAGNOSIS — R06.02 SHORTNESS OF BREATH: ICD-10-CM

## 2020-07-24 DIAGNOSIS — R60.0 BILATERAL LEG EDEMA: Primary | ICD-10-CM

## 2020-07-24 PROCEDURE — 97167 OT EVAL HIGH COMPLEX 60 MIN: CPT

## 2020-07-24 NOTE — THERAPY EVALUATION
Outpatient Occupational Therapy Lymphedema Initial Evaluation   Chan     Patient Name: Paty Marie  : 1960  MRN: [id number]  Today's Date: 2020      Visit Date: 2020    Patient Active Problem List   Diagnosis   • Lupus anticoagulant disorder (CMS/HCC)   • Aspirin long-term use   • Lichen sclerosus   • Endometrial cancer (CMS/HCC)   • Palpitations   • H/O lupus anticoagulant disorder   • Essential hypertension   • Type 2 diabetes mellitus without complication, without long-term current use of insulin (CMS/HCC)   • Hypercholesteremia   • Hypothyroidism   • Dizziness   • Precordial pain   • Bilateral leg edema   • Shortness of breath   • Metabolic syndrome   • Hypomagnesemia   • Blood clotting disorder (CMS/HCC)   • OP (osteoporosis)   • Fatigue   • Anxiety and depression   • Epigastric pain   • Nausea        Past Medical History:   Diagnosis Date   • Arrhythmia    • Arthritis    • Blood clotting disorder (CMS/HCC)    • Coronary artery disease    • Diabetes (CMS/HCC)    • Diastolic dysfunction    • Disease of thyroid gland    • Elevated cholesterol    • Endometrial cancer (CMS/HCC) 2019   • History of brachytherapy 2020    vaginal brachytherapy   • Hyperlipidemia    • Hypertension    • Lupus anticoagulant disorder (CMS/HCC)    • PONV (postoperative nausea and vomiting)         Past Surgical History:   Procedure Laterality Date   • ABDOMINAL SURGERY     • CARDIAC CATHETERIZATION  2010    Dr. Gama- 30% Distal LAD   • CONVERTED (HISTORICAL) HOLTER  10/28/2019    - AVG 73. . Rare PAC   • ECHO - CONVERTED  2019    Dr. Reyez- EF 55%. LA- 3.9 Cm. Mild MR. RVSP- 33 mmHg.   • LAPAROSCOPIC TUBAL LIGATION     • NECK SURGERY     • NODE DISSECTION LAPAROSCOPIC N/A 2019    STAGING WITH DAVINCI ROBOT WITH LYMPH NODE DISSECTION;  Surgeon: Jennifer Holman MD;  Location: [facility];  Service: [facility]   • SKIN CANCER EXCISION     • TOTAL LAPAROSCOPIC HYSTERECTOMY SALPINGO  OOPHORECTOMY Bilateral 8/30/2019    TOTAL LAPAROSCOPIC HYSTERECTOMY BILATERAL SALPINGOOPHORECTOMY WITH DAVINCI ROBOT, BIOPSIES OF LEFT VULVAR SKIN LESIONS;  Surgeon: Jennifer Holman MD;  Location: [facility];  Service: [facility]         Visit Dx:     ICD-10-CM ICD-9-CM   1. Bilateral leg edema R60.0 782.3   2. Shortness of breath R06.02 786.05   3. Endometrial cancer (CMS/[facility]) C54.1 182.0       Patient History     Row Name 07/24/20 1400             History    Chief Complaint  Difficulty Walking;Fatigue/poor endurance;Joint swelling;Joint stiffness;Muscle weakness;Muscle tenderness;Pain;Swelling;Tinglings;Tightness  -BC      Type of Pain  Knee pain;Back pain  -BC      Date Current Problem(s) Began  03/24/20  -BC      Brief Description of Current Complaint  Swelling in RLE w/pain in knee.  -BC      Previous treatment for THIS PROBLEM  -- None  -BC      Patient/Caregiver Goals  Relieve pain;Improve mobility;Know what to do to help the symptoms;Decrease swelling  -BC      Current Tobacco Use  N/A  -BC      Smoking Status  Vapor cigarette  -BC      Patient's Rating of General Health  Fair  -BC      Hand Dominance  right-handed  -BC      Occupation/sports/leisure activities  Disabled  -BC      Patient seeing anyone else for problem(s)?  No  -BC      How has patient tried to help current problem?  Elevation, compression socks  -BC      What clinical tests have you had for this problem?  X-ray  -BC      Results of Clinical Tests  Neg.  -BC      Related/Recent Hospitalizations  No  -BC      Are you or can you be pregnant  No  -BC         Pain     Pain Location  Back;Knee  -BC      Pain at Present  6  -BC      Pain at Best  0  -BC      Pain at Worst  7  -BC      Pain Frequency  Constant/continuous  -BC      Pain Description  Aching;Cramping;Discomfort;Dull;Nagging;Penetrating;Pounding;Pressure;Radiating;Sharp;Sore;Spasm;Tender;Throbbing;Tightness;Tiring  -BC      What Performance Factors Make the Current Problem(s) WORSE?   Walking  -BC      What Performance Factors Make the Current Problem(s) BETTER?  Elevation  -BC      Pain Comments  I have pain everyday.   -BC      Tolerance Time- Standing  Imp.  -BC      Tolerance Time- Sitting  Imp.  -BC      Tolerance Time- Walking  Imp.  -BC      Tolerance Time- Lying  Imp.  -BC      Is your sleep disturbed?  Yes  -BC      Is medication used to assist with sleep?  Yes  -BC      Total hours of sleep per night  5 hours  -BC      What position do you sleep in?  Supine Elevated  -BC      Difficulties at work?  Disabled  -BC      Difficulties with ADL's?  yes  -BC         Fall Risk Assessment    Any falls in the past year:  Yes  -BC      Number of falls reported in the last 12 months  3  -BC      Factors that contributed to the fall:  Lost balance My leg gave out.  -BC      Does patient have a fear of falling  No  -BC         Services    Prior Rehab/Home Health Experiences  No  -BC      Are you currently receiving Home Health services  No  -BC      Do you plan to receive Home Health services in the near future  No  -BC         Daily Activities    Primary Language  English  -BC      Are you able to read  Yes  -BC      Are you able to write  Yes  -BC      How does patient learn best?  Reading  -BC      Teaching needs identified  Home Exercise Program;Falls Prevention;Home Safety  -BC      Patient is concerned about/has problems with  Walking  -BC      Does patient have problems with the following?  Depression;Anxiety;Panic Attack  -BC      Barriers to learning  Visual Corrective lenses  -BC      Recommended Referrals  Occupational Therapy  -BC      Pt Participated in POC and Goals  Yes  -BC         Safety    Are you being hurt, hit, or frightened by anyone at home or in your life?  No  -BC      Are you being neglected by a caregiver  No  -BC        User Key  (r) = Recorded By, (t) = Taken By, (c) = Cosigned By    Initials Name Provider Type    Angie Camargo OT Occupational Therapist           Lymphedema     Row Name 07/24/20 1400             Subjective Pain    Able to rate subjective pain?  yes  -BC      Pre-Treatment Pain Level  6  -BC         Lymphedema Assessment    Lymphedema Classification  LLE:;Trunk:  -BC      Lymphedema Cancer Related Sx  left;hysterectomy  -BC      Lymph Nodes Removed #  8  -BC      Positive Lymph Nodes #  0  -BC      Stage of Cancer  Stage I  -BC      Chemo Received  no  -BC      Radiation Therapy Received  yes  -BC      Radiation Treatments #/Timeframe  6/14days  -BC      Adverse Radiation Reactions/Complication  burns  -BC      Infections or Cellulitis?  no  -BC         Posture/Observations    Alignment Options  Scapular elevation  -BC      Scapular Elevation  Mild;Right:;Standing posture;Sitting posture  -BC      Posture/Observations Comments  Pain in L knee and lower back  -BC         General ROM    GENERAL ROM COMMENTS  WFL  -BC         MMT (Manual Muscle Testing)    General MMT Comments  WFL  -BC         Lymphedema Edema Assessment    Ptting Edema Category  By grade out of 4  -BC      Pitting Edema  + 2/4  -BC      Stemmer Sign  negative  -BC      Edema Assessment Comment  Mild pitting edema L knee, L ankle  -BC         Skin Changes/Observations    Location/Assessment  Lower Extremity  -BC      Lower Extremity Conditions  left:;clean;dry  -BC      Lower Extremity Color/Pigment  left:;blanchable  -BC         Lymphedema Sensation    Lymphedema Sensation Reports  LLE:  -BC      Lymphedema Sensation Tests  light touch;deep touch  -BC      Lymphedema Light Touch  LLE:;WNL  -BC      Lymphedema Deep Touch  LLE:;mild impairment  -BC         Lymphedema Pulses/Capillary Refill    Lymphedema Pulses/Capillary Refill  lower extremity pulses  -BC      Dorsalis Pedis Pulse  left:;+2 normal  -BC      Posterior Tibialis Pulse  left:;+2 normal  -BC         Lymphedema Measurements    Measurement Type(s)  Quick Girth;Circumferential  -BC      Quick Girth Areas  Lower extremities  -BC       Circumferential Areas  Trunk  -BC         LLE Quick Girth (cm)    Met-heads  22.7 cm  -BC      Mid foot  24.5 cm  -BC      Smallest ankle  22.2 cm  -BC      Largest calf  40 cm  -BC      Tib tuberosity  39.5 cm  -BC      Mid patella  48 cm  -BC      Distal thigh  54.2 cm  -BC      Proximal thigh  65 cm  -BC      Other 1  24.2 cm  -BC      Other 2  35.5 cm  -BC         Trunk Circumferential (cm)    Measurement Location 1  Navel  -BC      Trunk 1  94 cm  -BC      Measurement Location 2  below breast  -BC      Trunk 2  90.4 cm  -BC      Measurement Location 3  top of hip  -BC      Trunk 3  118.8 cm  -BC      Trunk Circumferential Total  303.2 cm  -BC        User Key  (r) = Recorded By, (t) = Taken By, (c) = Cosigned By    Initials Name Provider Type    Angie Camargo OT Occupational Therapist                  Therapy Education  Given: Symptoms/condition management  Program: New  How Provided: Verbal  Provided to: Patient  Level of Understanding: Verbalized        OT Goals     Row Name 07/24/20 1700          OT Short Term Goals    STG Date to Achieve  08/24/20  -BC     STG 1  Pt. familiar w/precautions, skin care and self management of lymphedema.  -BC     STG 2  Decrease pitting edema to 1/4 for decreased risk of infection.  -BC     STG 3  HEP to assist with improved lymphatic flow.  -BC     STG 4  Self care instructions for home MLD for volume reduction.   -BC        Long Term Goals    LTG Date to Achieve  10/24/20  -BC     LTG 1  Pt. and/or care giver independent w/short stretch compression bandaging for volume reduction.  -BC     LTG 2  Decrease pitting edema to 0/4 for decreased risk of infection.  -BC     LTG 3  Independent with donning/doffing compression garment.  -BC     LTG 4  Independent with lymphedema management and HEP.  -BC        Time Calculation    OT Goal Re-Cert Due Date  10/24/20  -BC       User Key  (r) = Recorded By, (t) = Taken By, (c) = Cosigned By    Initials Name Provider Type    BC  Angie Coburn OT Occupational Therapist          OT Assessment/Plan     Row Name 07/24/20 1714          OT Assessment    Functional Limitations  Impaired gait;Performance in self-care ADL  -BC     Impairments  Edema;Gait;Joint mobility;Pain;Impaired lymphatic circulation  -BC     Assessment Comments  Pt. evaluated this date with s/s consistent with lymphedema in LLE, and abdomen.   -BC     Please refer to paper survey for additional self-reported information  Yes  -BC     OT Diagnosis  Lymphedema  -BC     OT Rehab Potential  Good  -BC     Patient/caregiver participated in establishment of treatment plan and goals  Yes  -BC     Patient would benefit from skilled therapy intervention  Yes  -BC        OT Plan    OT Frequency  2x/week  -BC     Predicted Duration of Therapy Intervention (Therapy Eval)  12 weeks  -BC     Planned CPT's?  OT EVAL HIGH COMPLEXITY: 44897;OT THER ACT EA 15 MIN: 23405HX;OT MANUAL THERAPY EA 15 MIN: 24767;OT RE-EVAL: 79559;OT SELF CARE/MGMT/TRAIN 15 MIN: 79981;OT VASOPNEUMATIC DEVICE: 88000 Lymphedema management  -BC     Planned Therapy Interventions (Optional Details)  home exercise program;manual therapy techniques;patient/family education;stretching  -BC     OT Plan Comments  Pt. would benefit from skilled intervention to address s/s of lymphedema, education and HEP.   -BC       User Key  (r) = Recorded By, (t) = Taken By, (c) = Cosigned By    Initials Name Provider Type    BC Angie Coburn OT Occupational Therapist                    Time Calculation:   OT Start Time: 1400  OT Stop Time: 1500  OT Time Calculation (min): 60 min  OT Non-Billable Time (min): 45 min     Therapy Charges for Today     Code Description Service Date Service Provider Modifiers Qty    61168271065  OT EVAL HIGH COMPLEXITY 3 7/24/2020 Angie Coburn OT GO 1                    Angie Coburn OT  7/24/2020

## 2020-08-03 ENCOUNTER — HOSPITAL ENCOUNTER (OUTPATIENT)
Dept: OCCUPATIONAL THERAPY | Facility: HOSPITAL | Age: 60
Setting detail: THERAPIES SERIES
Discharge: HOME | End: 2020-08-03

## 2020-08-03 DIAGNOSIS — R60.0 BILATERAL LEG EDEMA: Primary | ICD-10-CM

## 2020-08-03 DIAGNOSIS — C54.1 ENDOMETRIAL CANCER (HCC): ICD-10-CM

## 2020-08-03 PROCEDURE — 97535 SELF CARE MNGMENT TRAINING: CPT

## 2020-08-03 PROCEDURE — 97139 UNLISTED THERAPEUTIC PX: CPT

## 2020-08-03 PROCEDURE — 97016 VASOPNEUMATIC DEVICE THERAPY: CPT

## 2020-08-03 PROCEDURE — 97140 MANUAL THERAPY 1/> REGIONS: CPT

## 2020-08-03 NOTE — THERAPY TREATMENT NOTE
Outpatient Occupational Therapy Lymphedema Treatment Note  DANUTA Giles     Patient Name: Paty Marie  : 1960  MRN: [id number]  Today's Date: 8/3/2020      Visit Date: 2020    Patient Active Problem List   Diagnosis   • Lupus anticoagulant disorder (CMS/HCC)   • Aspirin long-term use   • Lichen sclerosus   • Endometrial cancer (CMS/HCC)   • Palpitations   • H/O lupus anticoagulant disorder   • Essential hypertension   • Type 2 diabetes mellitus without complication, without long-term current use of insulin (CMS/HCC)   • Hypercholesteremia   • Hypothyroidism   • Dizziness   • Precordial pain   • Bilateral leg edema   • Shortness of breath   • Metabolic syndrome   • Hypomagnesemia   • Blood clotting disorder (CMS/HCC)   • OP (osteoporosis)   • Fatigue   • Anxiety and depression   • Epigastric pain   • Nausea        Past Medical History:   Diagnosis Date   • Arrhythmia    • Arthritis    • Blood clotting disorder (CMS/HCC)    • Coronary artery disease    • Diabetes (CMS/HCC)    • Diastolic dysfunction    • Disease of thyroid gland    • Elevated cholesterol    • Endometrial cancer (CMS/HCC) 2019   • History of brachytherapy 2020    vaginal brachytherapy   • Hyperlipidemia    • Hypertension    • Lupus anticoagulant disorder (CMS/HCC)    • PONV (postoperative nausea and vomiting)         Past Surgical History:   Procedure Laterality Date   • ABDOMINAL SURGERY     • CARDIAC CATHETERIZATION  2010    Dr. Gama- 30% Distal LAD   • CONVERTED (HISTORICAL) HOLTER  10/28/2019    - AVG 73. . Rare PAC   • ECHO - CONVERTED  2019    Dr. Reyez- EF 55%. LA- 3.9 Cm. Mild MR. RVSP- 33 mmHg.   • LAPAROSCOPIC TUBAL LIGATION     • NECK SURGERY     • NODE DISSECTION LAPAROSCOPIC N/A 2019    STAGING WITH DAVINCI ROBOT WITH LYMPH NODE DISSECTION;  Surgeon: Jennifer Holman MD;  Location: [facility];  Service: [facility]   • SKIN CANCER EXCISION     • TOTAL LAPAROSCOPIC HYSTERECTOMY SALPINGO  OOPHORECTOMY Bilateral 8/30/2019    TOTAL LAPAROSCOPIC HYSTERECTOMY BILATERAL SALPINGOOPHORECTOMY WITH DAVINCI ROBOT, BIOPSIES OF LEFT VULVAR SKIN LESIONS;  Surgeon: Jennifer Holman MD;  Location: [facility];  Service: [facility]         Visit Dx:      ICD-10-CM ICD-9-CM   1. Bilateral leg edema R60.0 782.3   2. Endometrial cancer (CMS/HCC) C54.1 182.0       Lymphedema     Row Name 08/03/20 1300             Subjective Pain    Able to rate subjective pain?  yes  -BC      Pre-Treatment Pain Level  5  -BC      Post-Treatment Pain Level  0  -BC      Subjective Pain Comment  L knee  -BC         Subjective Comments    Subjective Comments  Pt. presented this date with c/o increased swelling, which she reported was common.   -BC         Lymphedema Assessment    Lymphedema Classification  LLE:;Trunk:  -BC      Lymphedema Cancer Related Sx  left;hysterectomy  -BC      Lymph Nodes Removed #  8  -BC      Positive Lymph Nodes #  0  -BC      Chemo Received  no  -BC      Radiation Therapy Received  yes  -BC      Infections or Cellulitis?  no  -BC         Posture/Observations    Alignment Options  Scapular elevation  -BC      Scapular Elevation  Mild;Right:;Standing posture;Sitting posture  -BC      Posture/Observations Comments  Pain in L knee and lower back  -BC         Lymphedema Edema Assessment    Ptting Edema Category  By grade out of 4  -BC      Pitting Edema  + 2/4  -BC      Stemmer Sign  negative  -BC         Skin Changes/Observations    Location/Assessment  Lower Extremity  -BC      Lower Extremity Conditions  left:;clean;dry  -BC      Lower Extremity Color/Pigment  left:;blanchable  -BC         Lymphedema Sensation    Lymphedema Sensation Reports  LLE:  -BC      Lymphedema Sensation Tests  light touch;deep touch  -BC      Lymphedema Light Touch  LLE:;WNL  -BC      Lymphedema Deep Touch  LLE:;mild impairment  -BC         Lymphedema Pulses/Capillary Refill    Lymphedema Pulses/Capillary Refill  lower extremity pulses  -BC       Dorsalis Pedis Pulse  left:;+2 normal  -BC      Posterior Tibialis Pulse  left:;+2 normal  -BC         Lymphedema Measurements    Measurement Type(s)  Quick Girth;Circumferential  -BC      Quick Girth Areas  Lower extremities  -BC      Circumferential Areas  Trunk  -BC         LLE Quick Girth (cm)    Met-heads  23.2 cm  -BC      Mid foot  25.5 cm  -BC      Smallest ankle  23 cm  -BC      Largest calf  41.1 cm  -BC      Tib tuberosity  40 cm  -BC      Mid patella  51.5 cm  -BC      Distal thigh  55 cm  -BC      Proximal thigh  66.1 cm  -BC      Other 1  24.9 cm  -BC      Other 2  35.4 cm  -BC         Trunk Circumferential (cm)    Measurement Location 1  Navel  -BC      Measurement Location 2  below breast  -BC      Measurement Location 3  top of hip  -BC         Manual Lymphatic Drainage    Manual Lymphatic Drainage  extremity treatment  -BC      Extremity Treatment  MLD to full limb  -BC      MLD to Full Limb  BLE  -BC      Manual Lymphatic Drainage Comments  Inguinals, axillary and abdomen.  -BC         Compression/Skin Care    Compression/Skin Care  skin care;bandaging  -BC      Skin Care  moisturizing lotion applied  -BC      Bandage Layers  cotton elastic stocking- single layer (comment size);soft foam- 1/4 inch;short-stretch bandages (comment size/quantity)  -BC      Bandaging Comments  Comprifoam x 3, comprilan 12cm x 2, 8cm x 1, tgsoft Med.   -BC      Bandaging Technique  light compression;circumferential/spiral  -BC      Compression/Skin Care Comments  Compression pump x Abd., BLE  -BC        User Key  (r) = Recorded By, (t) = Taken By, (c) = Cosigned By    Initials Name Provider Type    Angie Camargo OT Occupational Therapist                  OT Assessment/Plan     Row Name 08/03/20 5071          OT Assessment    Assessment Comments  Pt. positioned in supported recline for manual lymph drainage, compression pump, skin care and multi layered bandaging of LLE.  -BC       User Key  (r) = Recorded By,  (t) = Taken By, (c) = Cosigned By    Initials Name Provider Type    BC Angie Coburn OT Occupational Therapist                                     Time Calculation:   OT Start Time: 1300  OT Stop Time: 1430  OT Time Calculation (min): 90 min  OT Non-Billable Time (min): 20 min     Therapy Charges for Today     Code Description Service Date Service Provider Modifiers Qty    47418617057 HC OT MANUAL THERAPY EA 15 MIN 8/3/2020 Angie Coburn OT GO 3    75972127186 HC OT LYMPHEDEMA MANAGEMENT-15 MIN 8/3/2020 Angie Coburn OT  1    49762834954 HC OT SELF CARE/MGMT/TRAIN EA 15 MIN 8/3/2020 Angie Coburn OT GO 1    46106616971 HC OT VASOPNEUMAT DEVIC 1 OR MORE AREAS 8/3/2020 Angie Coburn OT GO 1                      Angie Coburn OT  8/3/2020

## 2020-08-06 ENCOUNTER — HOSPITAL ENCOUNTER (OUTPATIENT)
Dept: OCCUPATIONAL THERAPY | Facility: HOSPITAL | Age: 60
Setting detail: THERAPIES SERIES
Discharge: HOME | End: 2020-08-06

## 2020-08-06 DIAGNOSIS — R60.0 BILATERAL LEG EDEMA: Primary | ICD-10-CM

## 2020-08-06 DIAGNOSIS — C54.1 ENDOMETRIAL CANCER (HCC): ICD-10-CM

## 2020-08-06 DIAGNOSIS — R06.02 SHORTNESS OF BREATH: ICD-10-CM

## 2020-08-06 PROCEDURE — 97140 MANUAL THERAPY 1/> REGIONS: CPT

## 2020-08-06 PROCEDURE — 97016 VASOPNEUMATIC DEVICE THERAPY: CPT

## 2020-08-06 PROCEDURE — 97535 SELF CARE MNGMENT TRAINING: CPT

## 2020-08-06 PROCEDURE — 97139 UNLISTED THERAPEUTIC PX: CPT

## 2020-08-06 NOTE — THERAPY TREATMENT NOTE
Outpatient Occupational Therapy Lymphedema Treatment Note  DANUTA Giles     Patient Name: Paty Marie  : 1960  MRN: [id number]  Today's Date: 2020      Visit Date: 2020    Patient Active Problem List   Diagnosis   • Lupus anticoagulant disorder (CMS/HCC)   • Aspirin long-term use   • Lichen sclerosus   • Endometrial cancer (CMS/HCC)   • Palpitations   • H/O lupus anticoagulant disorder   • Essential hypertension   • Type 2 diabetes mellitus without complication, without long-term current use of insulin (CMS/HCC)   • Hypercholesteremia   • Hypothyroidism   • Dizziness   • Precordial pain   • Bilateral leg edema   • Shortness of breath   • Metabolic syndrome   • Hypomagnesemia   • Blood clotting disorder (CMS/HCC)   • OP (osteoporosis)   • Fatigue   • Anxiety and depression   • Epigastric pain   • Nausea        Past Medical History:   Diagnosis Date   • Arrhythmia    • Arthritis    • Blood clotting disorder (CMS/HCC)    • Coronary artery disease    • Diabetes (CMS/HCC)    • Diastolic dysfunction    • Disease of thyroid gland    • Elevated cholesterol    • Endometrial cancer (CMS/HCC) 2019   • History of brachytherapy 2020    vaginal brachytherapy   • Hyperlipidemia    • Hypertension    • Lupus anticoagulant disorder (CMS/HCC)    • PONV (postoperative nausea and vomiting)         Past Surgical History:   Procedure Laterality Date   • ABDOMINAL SURGERY     • CARDIAC CATHETERIZATION  2010    Dr. Gama- 30% Distal LAD   • CONVERTED (HISTORICAL) HOLTER  10/28/2019    - AVG 73. . Rare PAC   • ECHO - CONVERTED  2019    Dr. Reyez- EF 55%. LA- 3.9 Cm. Mild MR. RVSP- 33 mmHg.   • LAPAROSCOPIC TUBAL LIGATION     • NECK SURGERY     • NODE DISSECTION LAPAROSCOPIC N/A 2019    STAGING WITH DAVINCI ROBOT WITH LYMPH NODE DISSECTION;  Surgeon: Jennifer Holman MD;  Location: [facility];  Service: [facility]   • SKIN CANCER EXCISION     • TOTAL LAPAROSCOPIC HYSTERECTOMY SALPINGO  OOPHORECTOMY Bilateral 8/30/2019    TOTAL LAPAROSCOPIC HYSTERECTOMY BILATERAL SALPINGOOPHORECTOMY WITH DAVINCI ROBOT, BIOPSIES OF LEFT VULVAR SKIN LESIONS;  Surgeon: Jennifer Holman MD;  Location: [facility];  Service: [facility]         Visit Dx:      ICD-10-CM ICD-9-CM   1. Bilateral leg edema R60.0 782.3   2. Endometrial cancer (CMS/HCC) C54.1 182.0   3. Shortness of breath R06.02 786.05       Lymphedema     Row Name 08/06/20 1300             Subjective Pain    Able to rate subjective pain?  yes  -AB      Pre-Treatment Pain Level  6  -AB      Subjective Pain Comment  L knee  -AB         Subjective Comments    Subjective Comments  Pt. w/ c/o sharp, shooting pains in L knee.   -AB         Lymphedema Assessment    Lymphedema Classification  LLE:;Trunk:  -AB      Lymphedema Cancer Related Sx  left;hysterectomy  -AB      Lymph Nodes Removed #  8  -AB      Positive Lymph Nodes #  0  -AB      Chemo Received  no  -AB      Radiation Therapy Received  yes  -AB      Infections or Cellulitis?  no  -AB         Posture/Observations    Alignment Options  Scapular elevation  -AB      Scapular Elevation  Mild;Right:;Standing posture;Sitting posture  -AB      Posture/Observations Comments  Pain in L knee and lower back  -AB         Lymphedema Edema Assessment    Ptting Edema Category  By grade out of 4  -AB      Pitting Edema  + 2/4  -AB      Stemmer Sign  negative  -AB      Edema Assessment Comment  increased swelling to L knee area w/ bruising on the posterior side of knee/proximal calf area.   -AB         Skin Changes/Observations    Location/Assessment  Lower Extremity  -AB      Lower Extremity Conditions  left:;clean;dry  -AB      Lower Extremity Color/Pigment  left:;blanchable  -AB         Lymphedema Sensation    Lymphedema Sensation Reports  LLE:  -AB      Lymphedema Sensation Tests  light touch;deep touch  -AB      Lymphedema Light Touch  LLE:;WNL  -AB      Lymphedema Deep Touch  LLE:;mild impairment  -AB         Lymphedema  Pulses/Capillary Refill    Lymphedema Pulses/Capillary Refill  --  -AB      Dorsalis Pedis Pulse  --  -AB      Posterior Tibialis Pulse  --  -AB         Lymphedema Measurements    Measurement Type(s)  Quick Girth;Circumferential  -AB      Quick Girth Areas  Lower extremities  -AB      Circumferential Areas  Trunk  -AB         LLE Quick Girth (cm)    Met-heads  24.2 cm  -AB      Mid foot  26.6 cm  -AB      Smallest ankle  23.6 cm  -AB      Largest calf  40.9 cm  -AB      Tib tuberosity  42.1 cm  -AB      Mid patella  50.7 cm  -AB      Distal thigh  54.2 cm  -AB      Proximal thigh  65.8 cm  -AB      Other 1  25.9 cm  -AB      Other 2  36.4 cm  -AB         RLE Quick Girth (cm)    Met-heads  22.9 cm  -AB      Mid foot  25.1 cm  -AB      Smallest ankle  23.7 cm  -AB      Largest calf  41.1 cm  -AB      Tib tuberosity  40.5 cm  -AB      Mid patella  49.4 cm  -AB      Distal thigh  53.2 cm  -AB      Proximal thigh  66.9 cm  -AB      Other 1  27.6 cm  -AB      Other 2  38.6 cm  -AB      RLE Quick Girth Total  389  -AB         Trunk Circumferential (cm)    Measurement Location 1  Navel  -AB      Measurement Location 2  below breast  -AB      Measurement Location 3  top of hip  -AB         Manual Lymphatic Drainage    Manual Lymphatic Drainage  extremity treatment;opened regional lymph nodes  -AB      Opened Regional Lymph Nodes  inguinal  -AB      Extremity Treatment  MLD to full limb  -AB      MLD to Full Limb  BLE's  -AB      Manual Therapy  MLD to BLE's, abdomen, inguinals  -AB         Compression/Skin Care    Compression/Skin Care  skin care;bandaging;wrapping location  -AB      Skin Care  moisturizing lotion applied  -AB      Wrapping Location  lower extremity  -AB      Wrapping Location LE  left: base of toes to mid thigh  -AB      Bandage Layers  cotton elastic stocking- single layer (comment size);short-stretch bandages (comment size/quantity);padding/fluff layer  -AB      Bandaging Comments  artiflex used this date  "instead of comprifoam per pt. request. She reports that the multi-layer bandage was \"too stiff\" after last visit.   -AB      Bandaging Technique  light compression;circumferential/spiral;figure-eight  -AB      Compression/Skin Care Comments  compression pump to abdomen and BLE's  -AB        User Key  (r) = Recorded By, (t) = Taken By, (c) = Cosigned By    Initials Name Provider Type    Kiara Esteban OT Occupational Therapist                  OT Assessment/Plan     Row Name 08/06/20 1530          OT Plan    OT Plan Comments  Pt. reports this date that she is experiencing sharp, shooting pains in her L knee. It appears very swollen and there is bruising to the posterior side of her knee/proximal calf region. Pt. has recently had x-ray of knee, w/ no problems found. OT recommends pt. contact her PCP for further follow up/assessment.   -AB       User Key  (r) = Recorded By, (t) = Taken By, (c) = Cosigned By    Initials Name Provider Type    Kiara Esteban OT Occupational Therapist                    Therapy Education  Given: HEP, Symptoms/condition management, Edema management, Bandaging/dressing change  Program: Reinforced  How Provided: Verbal, Demonstration  Provided to: Patient  Level of Understanding: Verbalized                Time Calculation:   OT Start Time: 1300  OT Stop Time: 1455  OT Time Calculation (min): 115 min  OT Non-Billable Time (min): 25 min  Total Timed Code Minutes- OT: 115 minute(s)     Therapy Charges for Today     Code Description Service Date Service Provider Modifiers Qty    07362772297 HC OT VASOPNEUMAT DEVIC 1 OR MORE AREAS 8/6/2020 Kiara Copeland OT GO 1    27580993392  OT MANUAL THERAPY EA 15 MIN 8/6/2020 Kiara Copeland OT GO 1    30468075202 HC OT LYMPHEDEMA MANAGEMENT-15 MIN 8/6/2020 Kiara Copeland OT  4    49625150577 HC OT SELF CARE/MGMT/TRAIN EA 15 MIN 8/6/2020 Kiara Copeland OT GO 2                      Kiara " Tila Copeland, OT  8/6/2020

## 2020-08-10 ENCOUNTER — HOSPITAL ENCOUNTER (OUTPATIENT)
Dept: OCCUPATIONAL THERAPY | Facility: HOSPITAL | Age: 60
Setting detail: THERAPIES SERIES
Discharge: HOME | End: 2020-08-10

## 2020-08-10 DIAGNOSIS — R06.02 SHORTNESS OF BREATH: ICD-10-CM

## 2020-08-10 DIAGNOSIS — R60.0 BILATERAL LEG EDEMA: Primary | ICD-10-CM

## 2020-08-10 DIAGNOSIS — C54.1 ENDOMETRIAL CANCER (HCC): ICD-10-CM

## 2020-08-10 PROCEDURE — 97016 VASOPNEUMATIC DEVICE THERAPY: CPT

## 2020-08-10 PROCEDURE — 97140 MANUAL THERAPY 1/> REGIONS: CPT

## 2020-08-10 PROCEDURE — 97535 SELF CARE MNGMENT TRAINING: CPT

## 2020-08-10 PROCEDURE — 97139 UNLISTED THERAPEUTIC PX: CPT

## 2020-08-13 ENCOUNTER — HOSPITAL ENCOUNTER (OUTPATIENT)
Dept: OCCUPATIONAL THERAPY | Facility: HOSPITAL | Age: 60
Setting detail: THERAPIES SERIES
End: 2020-08-13

## 2020-08-17 ENCOUNTER — APPOINTMENT (OUTPATIENT)
Dept: OCCUPATIONAL THERAPY | Facility: HOSPITAL | Age: 60
End: 2020-08-17

## 2020-08-19 ENCOUNTER — OFFICE VISIT (OUTPATIENT)
Dept: GYNECOLOGIC ONCOLOGY | Facility: CLINIC | Age: 60
End: 2020-08-19

## 2020-08-19 VITALS
WEIGHT: 198 LBS | HEART RATE: 60 BPM | OXYGEN SATURATION: 98 % | RESPIRATION RATE: 19 BRPM | TEMPERATURE: 98.4 F | SYSTOLIC BLOOD PRESSURE: 129 MMHG | BODY MASS INDEX: 32.99 KG/M2 | DIASTOLIC BLOOD PRESSURE: 76 MMHG | HEIGHT: 65 IN

## 2020-08-19 DIAGNOSIS — C54.1 ENDOMETRIAL CANCER (HCC): Primary | ICD-10-CM

## 2020-08-19 DIAGNOSIS — I89.0 LYMPHEDEMA OF LEFT LEG: ICD-10-CM

## 2020-08-19 DIAGNOSIS — M25.562 ACUTE PAIN OF LEFT KNEE: ICD-10-CM

## 2020-08-19 PROCEDURE — 99214 OFFICE O/P EST MOD 30 MIN: CPT | Performed by: NURSE PRACTITIONER

## 2020-08-19 NOTE — PROGRESS NOTES
GYN ONCOLOGY CANCER SURVEILLANCE FOLLOW-UP    Paty Marie  [id number]  1960    Chief Complaint: Follow-up (no gyn complaints)        History of present illness:  Paty Marie is a 60 y.o. year old female who is here today for ongoing surveillance of Endometrial Cancer, see Cancer History. She is 6 months out from completion of treatment. She denies vaginal bleeding, pelvic pain, concerning lesions, or changes in bowel or bladder function. Patient reports [facility] She has not gotten her pap smear results, but will call and have a copy faxed to our office.     Since our last visit patient has been seeing PT for LLE lymphedema. She notes this is somewhat controlled, however, shortly after starting PT she developed left knee pain. She has spoken with her PCP about this who ordered an x-ray (negative per patient report) and has referred her to orthopedics. She has also seen GI in consult since our last visit for management of epigastric pain and reflux. Symptoms are currently improved.        Cancer History:      Endometrial cancer (CMS/HCC)    6/26/2019 Biopsy     EMB by Dr. Juju Chadwick for postmenopausal bleeding with ultrasound showing endometrium thickened to 8.8 mm.   Pathology showed complex atypical hyperplasia. Referred to Gyn Oncology      8/30/2019 Surgery     RTLH/BSO and left vulvar biopsies.     Pathology showed 4.0 x 3.0 x 1.0 cm grade 1 endometrioid adenocarcinoma, invasive into 11/18 mm myometrium (>50%). Vulvar biopsies benign. Lymphvascular space invasion positive. MSI testing normal.       9/19/2019 Imaging     CT abdomen pelvis negative for metastatic disease      11/22/2019 Surgery     Lymph node dissection and staging with robot. All nodes negative.   Final Stage IB grade 1      2/3/2020 - 2/12/2020 Radiation     Radiation OncologyTreatment Course:  Paty Marie received 3000 cGy in 5 fractions to vagina via High Dose  Radiation - HDR.      5/19/2020 Survivorship     Survivorship Care Plan completed and discussed with patient.  Copy of Survivorship Care Plan provided to patient and primary care provider.         Past Medical History:   Diagnosis Date   • Arrhythmia    • Arthritis    • Blood clotting disorder (CMS/HCC)    • Coronary artery disease    • Diabetes (CMS/HCC)    • Diastolic dysfunction    • Disease of thyroid gland    • Elevated cholesterol    • Endometrial cancer (CMS/HCC) 9/19/2019   • History of brachytherapy 02/12/2020    vaginal brachytherapy   • Hyperlipidemia    • Hypertension    • Lupus anticoagulant disorder (CMS/HCC)    • PONV (postoperative nausea and vomiting)        Past Surgical History:   Procedure Laterality Date   • ABDOMINAL SURGERY     • CARDIAC CATHETERIZATION  02/23/2010    Dr. Gama- 30% Distal LAD   • CONVERTED (HISTORICAL) HOLTER  10/28/2019    - AVG 73. . Rare PAC   • ECHO - CONVERTED  05/23/2019    Dr. Reyez- EF 55%. LA- 3.9 Cm. Mild MR. RVSP- 33 mmHg.   • LAPAROSCOPIC TUBAL LIGATION     • NECK SURGERY     • NODE DISSECTION LAPAROSCOPIC N/A 11/22/2019    STAGING WITH DAVINCI ROBOT WITH LYMPH NODE DISSECTION;  Surgeon: Jennifer Holman MD;  Location:  [facility];  Service: [facility]   • SKIN CANCER EXCISION     • TOTAL LAPAROSCOPIC HYSTERECTOMY SALPINGO OOPHORECTOMY Bilateral 8/30/2019    TOTAL LAPAROSCOPIC HYSTERECTOMY BILATERAL SALPINGOOPHORECTOMY WITH DAVINCI ROBOT, BIOPSIES OF LEFT VULVAR SKIN LESIONS;  Surgeon: Jennifer Holman MD;  Location:  [facility];  Service: [facility]       MEDICATIONS: The current medication list was reviewed and reconciled.     Allergies:  is allergic to contrast dye; erythromycin; and zofran [ondansetron hcl].    Family History   Problem Relation Age of Onset   • Stroke Mother    • Hypertension Mother    • Diabetes Mother    • Heart attack Mother    • Kidney disease Mother    • Heart attack Father    • Stroke Father    • Heart disease Father    • Diabetes  "Father    • Atrial fibrillation Sister    • Heart attack Brother    • Lymphoma Brother    • Cancer Brother    • Other Other    • Heart disease Sister         CABG 3 times   • COPD Sister    • Kidney disease Sister    • No Known Problems Sister    • Heart attack Brother    • Heart attack Brother    • Heart attack Brother    • No Known Problems Son    • No Known Problems Daughter        Last imaging study was CT abdomen pelvis 9/19/2019.     Review of Systems   Constitutional: Negative for appetite change, chills, fatigue, fever and unexpected weight change.   Respiratory: Negative for cough, shortness of breath and wheezing.    Cardiovascular: Positive for leg swelling (LLE lymphedema). Negative for chest pain and palpitations.   Gastrointestinal: Negative for abdominal distention, abdominal pain, blood in stool, constipation, diarrhea, nausea and vomiting.   Endocrine: Negative.    Genitourinary: Negative for dyspareunia, dysuria, frequency, genital sores, hematuria, pelvic pain, urgency, vaginal bleeding, vaginal discharge and vaginal pain.   Musculoskeletal: Positive for arthralgias (left knee). Negative for gait problem and joint swelling.   Neurological: Negative for dizziness, seizures, syncope, weakness, light-headedness, numbness and headaches.   Hematological: Negative for adenopathy.   Psychiatric/Behavioral: Negative.        PHQ-9 Total Score: 3      Physical Exam  Vital Signs: /76   Pulse 60   Temp 98.4 °F (36.9 °C) (Temporal)   Resp 19   Ht 165.1 cm (65\")   Wt 89.8 kg (198 lb)   SpO2 98%   BMI 32.95 kg/m²   Vitals:    08/19/20 1448   PainSc:   7   PainLoc: Knee           General Appearance:  alert, cooperative, no apparent distress, appears stated age and obese   Neurologic/Psychiatric: A&O x 3, gait steady, appropriate affect   HEENT:  Normocephalic, without obvious abnormality, mucous membranes moist   Lungs:   Clear to auscultation bilaterally; respirations regular, even, and unlabored " bilaterally   Heart:  Regular rate and rhythm, no murmurs appreciated   Breasts:  deferred   Abdomen:   Soft, non-tender, non-distended and no organomegaly   Lymph nodes: No cervical, supraclavicular, inguinal adenopathy noted   Extremities: atraumatic; no clubbing, cyanosis. BLE edema, left>right, 2+ pitting edema in left ankle    Pelvic: External Genitalia  without lesions or skin changes  Vagina  is pink, moist, without lesions.   Vaginal Cuff  Female Vaginal Cuff: smooth, intact, without visible lesions and changes consistent with previous radiation. Pap obtained.   Uterus  surgically absent and no palpable masses  Ovaries  surgically absent bilaterally  Parametria  smooth  Rectovaginal  Female rectovaginal: deferred     ECOG Performance Status: (1) Restricted in Physically Strenuous Activity, Ambulatory & Able to Do Work of Light Nature    Procedure Note:  No notes on file      Assessment and Plan:  Paty was seen today for follow-up.    Diagnoses and all orders for this visit:    Endometrial cancer (CMS/HCC)    Lymphedema of left leg    Acute pain of left knee        There is no evidence of disease upon today's exam. Continue every 3 month cancer surveillance visits for the first 2 years. She is understanding to call with any changes in pelvic symptoms or general GYN concerns at any time between regularly scheduled visits.     Patient encouraged to continue PT for lymphedema management.     Pain assessment was performed today as a part of patient’s care.  For patients with pain related to surgery, gynecologic malignancy or cancer treatment, the plan is as noted in the assessment/plan.  For patients with pain not related to these issues, they are to seek any further needed care from a more appropriate provider, such as PCP.  Paty Marie reports a pain score of 7.  Given her pain assessment as noted, treatment options were discussed and the following options were decided upon as a follow-up plan to address the  patient's pain: use of non-medical modalities (ice, heat, stretching and/or behavior modifications). PCP has referred patient to ortho, consult pending.       Return to clinic in 3 months for ongoing cancer surveillance.      Electronically signed by MARK Doll on 08/25/20 at 11:54

## 2020-08-20 ENCOUNTER — HOSPITAL ENCOUNTER (OUTPATIENT)
Dept: OCCUPATIONAL THERAPY | Facility: HOSPITAL | Age: 60
Setting detail: THERAPIES SERIES
Discharge: HOME | End: 2020-08-20

## 2020-08-20 DIAGNOSIS — C54.1 ENDOMETRIAL CANCER (HCC): ICD-10-CM

## 2020-08-20 DIAGNOSIS — R60.0 BILATERAL LEG EDEMA: Primary | ICD-10-CM

## 2020-08-20 DIAGNOSIS — R06.02 SHORTNESS OF BREATH: ICD-10-CM

## 2020-08-20 PROCEDURE — 97016 VASOPNEUMATIC DEVICE THERAPY: CPT

## 2020-08-20 PROCEDURE — 97535 SELF CARE MNGMENT TRAINING: CPT

## 2020-08-20 PROCEDURE — 97140 MANUAL THERAPY 1/> REGIONS: CPT

## 2020-08-20 PROCEDURE — 97139 UNLISTED THERAPEUTIC PX: CPT

## 2020-08-20 NOTE — THERAPY TREATMENT NOTE
Outpatient Occupational Therapy Lymphedema Treatment Note  DANUTA Giles     Patient Name: Paty Marie  : 1960  MRN: [id number]  Today's Date: 2020      Visit Date: 2020    Patient Active Problem List   Diagnosis   • Lupus anticoagulant disorder (CMS/HCC)   • Aspirin long-term use   • Lichen sclerosus   • Endometrial cancer (CMS/HCC)   • Palpitations   • H/O lupus anticoagulant disorder   • Essential hypertension   • Type 2 diabetes mellitus without complication, without long-term current use of insulin (CMS/HCC)   • Hypercholesteremia   • Hypothyroidism   • Dizziness   • Precordial pain   • Bilateral leg edema   • Shortness of breath   • Metabolic syndrome   • Hypomagnesemia   • Blood clotting disorder (CMS/HCC)   • OP (osteoporosis)   • Fatigue   • Anxiety and depression   • Epigastric pain   • Nausea        Past Medical History:   Diagnosis Date   • Arrhythmia    • Arthritis    • Blood clotting disorder (CMS/HCC)    • Coronary artery disease    • Diabetes (CMS/HCC)    • Diastolic dysfunction    • Disease of thyroid gland    • Elevated cholesterol    • Endometrial cancer (CMS/HCC) 2019   • History of brachytherapy 2020    vaginal brachytherapy   • Hyperlipidemia    • Hypertension    • Lupus anticoagulant disorder (CMS/HCC)    • PONV (postoperative nausea and vomiting)         Past Surgical History:   Procedure Laterality Date   • ABDOMINAL SURGERY     • CARDIAC CATHETERIZATION  2010    Dr. Gama- 30% Distal LAD   • CONVERTED (HISTORICAL) HOLTER  10/28/2019    - AVG 73. . Rare PAC   • ECHO - CONVERTED  2019    Dr. Reyez- EF 55%. LA- 3.9 Cm. Mild MR. RVSP- 33 mmHg.   • LAPAROSCOPIC TUBAL LIGATION     • NECK SURGERY     • NODE DISSECTION LAPAROSCOPIC N/A 2019    STAGING WITH DAVINCI ROBOT WITH LYMPH NODE DISSECTION;  Surgeon: Jennifer Holman MD;  Location: [facility];  Service: [facility]   • SKIN CANCER EXCISION     • TOTAL LAPAROSCOPIC HYSTERECTOMY SALPINGO  OOPHORECTOMY Bilateral 8/30/2019    TOTAL LAPAROSCOPIC HYSTERECTOMY BILATERAL SALPINGOOPHORECTOMY WITH DAVINCI ROBOT, BIOPSIES OF LEFT VULVAR SKIN LESIONS;  Surgeon: Jennifer Holman MD;  Location: [facility];  Service: [facility]         Visit Dx:      ICD-10-CM ICD-9-CM   1. Bilateral leg edema R60.0 782.3   2. Endometrial cancer (CMS/HCC) C54.1 182.0   3. Shortness of breath R06.02 786.05       Lymphedema     Row Name 08/20/20 1300             Subjective Pain    Able to rate subjective pain?  yes  -BC      Pre-Treatment Pain Level  5  -BC      Subjective Pain Comment  L knee  -BC         Subjective Comments    Subjective Comments  Pt. presents this date with no complaints voiced .   -BC         Lymphedema Assessment    Lymphedema Classification  LLE:;Trunk:  -BC      Lymphedema Cancer Related Sx  left;hysterectomy  -BC      Lymph Nodes Removed #  8  -BC      Positive Lymph Nodes #  0  -BC      Chemo Received  no  -BC      Radiation Therapy Received  yes  -BC      Infections or Cellulitis?  no  -BC         Posture/Observations    Alignment Options  Scapular elevation  -BC      Scapular Elevation  Mild;Right:;Standing posture;Sitting posture  -BC      Posture/Observations Comments  Pain in L knee and lower back  -BC         Lymphedema Edema Assessment    Ptting Edema Category  By grade out of 4  -BC      Pitting Edema  + 2/4  -BC      Stemmer Sign  negative  -BC         Skin Changes/Observations    Location/Assessment  Lower Extremity  -BC      Lower Extremity Conditions  left:;clean;dry  -BC      Lower Extremity Color/Pigment  left:;blanchable  -BC         Lymphedema Sensation    Lymphedema Sensation Reports  LLE:  -BC      Lymphedema Sensation Tests  light touch;deep touch  -BC      Lymphedema Light Touch  LLE:;WNL  -BC      Lymphedema Deep Touch  LLE:;mild impairment  -BC         Lymphedema Measurements    Measurement Type(s)  Quick Girth;Circumferential  -BC      Quick Girth Areas  Lower extremities  -BC       Circumferential Areas  Trunk  -BC         LLE Quick Girth (cm)    Met-heads  24.2 cm  -BC      Mid foot  24.7 cm  -BC      Smallest ankle  23.6 cm  -BC      Largest calf  40.5 cm  -BC      Tib tuberosity  40 cm  -BC      Mid patella  48.4 cm  -BC      Distal thigh  54.6 cm  -BC      Proximal thigh  64.8 cm  -BC      Other 1  24.9 cm  -BC      Other 2  34.5 cm  -BC         Trunk Circumferential (cm)    Measurement Location 1  Navel  -BC      Measurement Location 2  below breast  -BC      Measurement Location 3  top of hip  -BC         Manual Lymphatic Drainage    Manual Lymphatic Drainage  extremity treatment;opened regional lymph nodes  -BC      Opened Regional Lymph Nodes  inguinal  -BC      Extremity Treatment  MLD to full limb  -BC      MLD to Full Limb  BLE's  -BC      Manual Therapy  MLD to BLE's,   -BC         Compression/Skin Care    Compression/Skin Care  skin care;bandaging;wrapping location  -BC      Skin Care  moisturizing lotion applied  -BC      Wrapping Location  lower extremity  -BC      Wrapping Location LE  left: base of toes to mid thigh  -BC      Bandage Layers  cotton elastic stocking- single layer (comment size);short-stretch bandages (comment size/quantity);padding/fluff layer  -BC      Bandaging Technique  light compression;circumferential/spiral;figure-eight  -BC      Compression/Skin Care Comments  Compression pump x Abd. , LLE  -BC        User Key  (r) = Recorded By, (t) = Taken By, (c) = Cosigned By    Initials Name Provider Type    BC Angie Coburn, OT Occupational Therapist                  OT Assessment/Plan     Row Name 08/20/20 0081          OT Assessment    Assessment Comments  Pt. positioned in supine with head elevated for manual lymph drainage, compression pump, skin care and multi layered bandaging of LLE.  Bandages wrapped in two sections with med. stockinette below knee to toes and large    -BC       User Key  (r) = Recorded By, (t) = Taken By, (c) = Cosigned By    Initials  Name Provider Type    BC Angie Coburn OT Occupational Therapist                                     Time Calculation:   OT Start Time: 1315  OT Stop Time: 1500  OT Time Calculation (min): 105 min  OT Non-Billable Time (min): 25 min     Therapy Charges for Today     Code Description Service Date Service Provider Modifiers Qty    72349643911 HC OT MANUAL THERAPY EA 15 MIN 8/20/2020 Angie Coburn OT GO, KX 4    43665150000 HC OT LYMPHEDEMA MANAGEMENT-15 MIN 8/20/2020 Angie Coburn OT KX 1    08674652883 HC OT SELF CARE/MGMT/TRAIN EA 15 MIN 8/20/2020 Angie Coburn OT GO, KX 1    28932540266 HC OT VASOPNEUMAT DEVIC 1 OR MORE AREAS 8/20/2020 Angie Coburn OT GO, KX 1                      Angie Coburn OT  8/20/2020

## 2020-08-24 ENCOUNTER — APPOINTMENT (OUTPATIENT)
Dept: OCCUPATIONAL THERAPY | Facility: HOSPITAL | Age: 60
End: 2020-08-24

## 2020-08-27 ENCOUNTER — HOSPITAL ENCOUNTER (OUTPATIENT)
Dept: OCCUPATIONAL THERAPY | Facility: HOSPITAL | Age: 60
Setting detail: THERAPIES SERIES
Discharge: HOME | End: 2020-08-27

## 2020-08-27 DIAGNOSIS — R06.02 SHORTNESS OF BREATH: ICD-10-CM

## 2020-08-27 DIAGNOSIS — R60.0 BILATERAL LEG EDEMA: Primary | ICD-10-CM

## 2020-08-27 DIAGNOSIS — C54.1 ENDOMETRIAL CANCER (HCC): ICD-10-CM

## 2020-08-27 PROCEDURE — 97139 UNLISTED THERAPEUTIC PX: CPT

## 2020-08-27 PROCEDURE — 97016 VASOPNEUMATIC DEVICE THERAPY: CPT

## 2020-08-27 PROCEDURE — 97140 MANUAL THERAPY 1/> REGIONS: CPT

## 2020-08-27 PROCEDURE — 97535 SELF CARE MNGMENT TRAINING: CPT

## 2020-08-27 NOTE — THERAPY PROGRESS REPORT/RE-CERT
Outpatient Occupational Therapy Lymphedema Progress Note   Chan     Patient Name: Paty Marie  : 1960  MRN: [id number]  Today's Date: 2020      Visit Date: 2020    Patient Active Problem List   Diagnosis   • Lupus anticoagulant disorder (CMS/HCC)   • Aspirin long-term use   • Lichen sclerosus   • Endometrial cancer (CMS/HCC)   • Palpitations   • H/O lupus anticoagulant disorder   • Essential hypertension   • Type 2 diabetes mellitus without complication, without long-term current use of insulin (CMS/HCC)   • Hypercholesteremia   • Hypothyroidism   • Dizziness   • Precordial pain   • Bilateral leg edema   • Shortness of breath   • Metabolic syndrome   • Hypomagnesemia   • Blood clotting disorder (CMS/HCC)   • OP (osteoporosis)   • Fatigue   • Anxiety and depression   • Epigastric pain   • Nausea        Past Medical History:   Diagnosis Date   • Arrhythmia    • Arthritis    • Blood clotting disorder (CMS/HCC)    • Coronary artery disease    • Diabetes (CMS/HCC)    • Diastolic dysfunction    • Disease of thyroid gland    • Elevated cholesterol    • Endometrial cancer (CMS/HCC) 2019   • History of brachytherapy 2020    vaginal brachytherapy   • Hyperlipidemia    • Hypertension    • Lupus anticoagulant disorder (CMS/HCC)    • PONV (postoperative nausea and vomiting)         Past Surgical History:   Procedure Laterality Date   • ABDOMINAL SURGERY     • CARDIAC CATHETERIZATION  2010    Dr. Gama- 30% Distal LAD   • CONVERTED (HISTORICAL) HOLTER  10/28/2019    - AVG 73. . Rare PAC   • ECHO - CONVERTED  2019    Dr. Reyez- EF 55%. LA- 3.9 Cm. Mild MR. RVSP- 33 mmHg.   • LAPAROSCOPIC TUBAL LIGATION     • NECK SURGERY     • NODE DISSECTION LAPAROSCOPIC N/A 2019    STAGING WITH DAVINCI ROBOT WITH LYMPH NODE DISSECTION;  Surgeon: Jennifer Holman MD;  Location: [facility];  Service: [facility]   • SKIN CANCER EXCISION     • TOTAL LAPAROSCOPIC HYSTERECTOMY SALPINGO  OOPHORECTOMY Bilateral 8/30/2019    TOTAL LAPAROSCOPIC HYSTERECTOMY BILATERAL SALPINGOOPHORECTOMY WITH DAVINCI ROBOT, BIOPSIES OF LEFT VULVAR SKIN LESIONS;  Surgeon: Jennifer Holman MD;  Location: [facility];  Service: [facility]         Visit Dx:      ICD-10-CM ICD-9-CM   1. Bilateral leg edema R60.0 782.3   2. Endometrial cancer (CMS/HCC) C54.1 182.0   3. Shortness of breath R06.02 786.05       Lymphedema     Row Name 08/27/20 1300             Subjective Pain    Able to rate subjective pain?  yes  -AB      Pre-Treatment Pain Level  0  -AB      Subjective Pain Comment  denies pain  -AB         Subjective Comments    Subjective Comments  Pt. reports that her knee feels better, however, she hasn't been weight bearing through that L leg.   -AB         Lymphedema Assessment    Lymphedema Classification  LLE:;Trunk:  -AB      Lymphedema Cancer Related Sx  left;hysterectomy  -AB      Lymph Nodes Removed #  8  -AB      Positive Lymph Nodes #  0  -AB      Chemo Received  no  -AB      Radiation Therapy Received  yes  -AB      Infections or Cellulitis?  no  -AB         Posture/Observations    Alignment Options  Scapular elevation  -AB      Scapular Elevation  Mild;Right:;Standing posture;Sitting posture  -AB      Posture/Observations Comments  Pain in L knee and lower back  -AB         Lymphedema Edema Assessment    Ptting Edema Category  By grade out of 4  -AB      Pitting Edema  + 2/4  -AB      Stemmer Sign  negative  -AB         Skin Changes/Observations    Location/Assessment  Lower Extremity  -AB      Lower Extremity Conditions  left:;clean;dry  -AB      Lower Extremity Color/Pigment  left:;blanchable  -AB         Lymphedema Sensation    Lymphedema Sensation Reports  LLE:  -AB      Lymphedema Sensation Tests  light touch;deep touch  -AB      Lymphedema Light Touch  LLE:;WNL  -AB      Lymphedema Deep Touch  LLE:;mild impairment  -AB         Lymphedema Measurements    Measurement Type(s)  Quick Girth;Circumferential  -AB       Quick Girth Areas  Lower extremities  -AB      Circumferential Areas  Trunk  -AB         LLE Quick Girth (cm)    Met-heads  24.6 cm  -AB      Mid foot  26.2 cm  -AB      Smallest ankle  24.2 cm  -AB      Largest calf  42.4 cm  -AB      Tib tuberosity  41.8 cm  -AB      Mid patella  47.4 cm  -AB      Distal thigh  56.1 cm  -AB      Other 1  26.2 cm  -AB      Other 2  35.7 cm  -AB         RLE Quick Girth (cm)    Met-heads  22.6 cm  -AB      Mid foot  25.4 cm  -AB      Smallest ankle  23 cm  -AB      Largest calf  41.4 cm  -AB      Tib tuberosity  40.8 cm  -AB      Mid patella  46.8 cm  -AB      Distal thigh  53.8 cm  -AB      Other 1  26.5 cm  -AB      Other 2  37.6 cm  -AB      RLE Quick Girth Total  317.9  -AB         Trunk Circumferential (cm)    Measurement Location 1  Navel  -AB      Measurement Location 2  below breast  -AB      Measurement Location 3  top of hip  -AB         Manual Lymphatic Drainage    Manual Lymphatic Drainage  extremity treatment;opened regional lymph nodes  -AB      Opened Regional Lymph Nodes  inguinal  -AB      Extremity Treatment  MLD to full limb  -AB      MLD to Full Limb  BLE's  -AB      Manual Therapy  MLD to BLE's, abdomen, inguinals  -AB         Compression/Skin Care    Compression/Skin Care  skin care;bandaging;wrapping location  -AB      Skin Care  moisturizing lotion applied  -AB      Wrapping Location  lower extremity  -AB      Wrapping Location LE  left: base of toes to mid thigh  -AB      Wrapping Comments  base of toes to below knee, above to knee to high thigh  -AB      Bandage Layers  cotton elastic stocking- single layer (comment size);short-stretch bandages (comment size/quantity);padding/fluff layer  -AB      Bandaging Technique  light compression;circumferential/spiral  -AB      Compression/Skin Care Comments  compression pump to abdomen and BLE's  -AB        User Key  (r) = Recorded By, (t) = Taken By, (c) = Cosigned By    Initials Name Provider Type    AB Copeland  Kiara Adorno, OT Occupational Therapist                  OT Assessment/Plan     Row Name 08/27/20 1701          OT Assessment    OT Rehab Potential  Good  -AB     Patient/caregiver participated in establishment of treatment plan and goals  Yes  -AB     Patient would benefit from skilled therapy intervention  Yes  -AB        OT Plan    OT Frequency  2x/week  -AB     OT Plan Comments  Pt. will benefit from continued skilled OT services to address ongoing lymphedema needs.   -AB       User Key  (r) = Recorded By, (t) = Taken By, (c) = Cosigned By    Initials Name Provider Type    Kiara Esteban, OT Occupational Therapist                OT Goals     Row Name 08/27/20 1700          OT Short Term Goals    STG Date to Achieve  09/24/20  -AB     STG 1  Pt. familiar w/precautions, skin care and self management of lymphedema.  -AB     STG 1 Progress  Ongoing  -AB     STG 2  Decrease pitting edema to 1/4 for decreased risk of infection.  -AB     STG 2 Progress  Ongoing  -AB     STG 3  HEP to assist with improved lymphatic flow.  -AB     STG 3 Progress  Ongoing  -AB     STG 4  Self care instructions for home MLD for volume reduction.   -AB     STG 4 Progress  Met  -AB        Long Term Goals    LTG Date to Achieve  10/24/20  -AB     LTG 1  Pt. and/or care giver independent w/short stretch compression bandaging for volume reduction.  -AB     LTG 1 Progress  Ongoing  -AB     LTG 2  Decrease pitting edema to 0/4 for decreased risk of infection.  -AB     LTG 2 Progress  Ongoing  -AB     LTG 3  Independent with donning/doffing compression garment.  -AB     LTG 3 Progress  Ongoing  -AB     LTG 4  Independent with lymphedema management and HEP.  -AB     LTG 4 Progress  Ongoing  -AB        Time Calculation    OT Goal Re-Cert Due Date  10/24/20  -AB       User Key  (r) = Recorded By, (t) = Taken By, (c) = Cosigned By    Initials Name Provider Type    Kiara Esteban, OT Occupational Therapist          Therapy  Education  Given: HEP, Symptoms/condition management, Edema management, Bandaging/dressing change  Program: Reinforced  How Provided: Verbal, Demonstration  Provided to: Patient  Level of Understanding: Verbalized                Time Calculation:   OT Start Time: 1310  OT Stop Time: 1500  OT Time Calculation (min): 110 min  OT Non-Billable Time (min): 35 min  Total Timed Code Minutes- OT: 110 minute(s)     Therapy Charges for Today     Code Description Service Date Service Provider Modifiers Qty    96795728754 HC OT VASOPNEUMAT DEVIC 1 OR MORE AREAS 8/27/2020 Kiara Copeland, OT GO, KX 1    87395650925 HC OT MANUAL THERAPY EA 15 MIN 8/27/2020 Kiara Copeland OT GO, KX 4    44906589027 HC OT LYMPHEDEMA MANAGEMENT-15 MIN 8/27/2020 Kiara Copeland, OT KX 1    51098880663 HC OT SELF CARE/MGMT/TRAIN EA 15 MIN 8/27/2020 Kiara Copeland OT GO, KX 2                      Kiara Copeland OT  8/27/2020

## 2020-09-02 ENCOUNTER — HOSPITAL ENCOUNTER (OUTPATIENT)
Dept: OCCUPATIONAL THERAPY | Facility: HOSPITAL | Age: 60
Setting detail: THERAPIES SERIES
Discharge: HOME | End: 2020-09-02

## 2020-09-02 DIAGNOSIS — R60.0 BILATERAL LEG EDEMA: Primary | ICD-10-CM

## 2020-09-02 DIAGNOSIS — R06.02 SHORTNESS OF BREATH: ICD-10-CM

## 2020-09-02 DIAGNOSIS — C54.1 ENDOMETRIAL CANCER (HCC): ICD-10-CM

## 2020-09-02 PROCEDURE — 97140 MANUAL THERAPY 1/> REGIONS: CPT

## 2020-09-02 PROCEDURE — 97139 UNLISTED THERAPEUTIC PX: CPT

## 2020-09-02 PROCEDURE — 97535 SELF CARE MNGMENT TRAINING: CPT

## 2020-09-02 PROCEDURE — 97016 VASOPNEUMATIC DEVICE THERAPY: CPT

## 2020-09-02 NOTE — THERAPY TREATMENT NOTE
Outpatient Occupational Therapy Lymphedema Treatment Note  DANUTA Giles     Patient Name: Paty Marie  : 1960  MRN: [id number]  Today's Date: 2020      Visit Date: 2020    Patient Active Problem List   Diagnosis   • Lupus anticoagulant disorder (CMS/HCC)   • Aspirin long-term use   • Lichen sclerosus   • Endometrial cancer (CMS/HCC)   • Palpitations   • H/O lupus anticoagulant disorder   • Essential hypertension   • Type 2 diabetes mellitus without complication, without long-term current use of insulin (CMS/HCC)   • Hypercholesteremia   • Hypothyroidism   • Dizziness   • Precordial pain   • Bilateral leg edema   • Shortness of breath   • Metabolic syndrome   • Hypomagnesemia   • Blood clotting disorder (CMS/HCC)   • OP (osteoporosis)   • Fatigue   • Anxiety and depression   • Epigastric pain   • Nausea        Past Medical History:   Diagnosis Date   • Arrhythmia    • Arthritis    • Blood clotting disorder (CMS/HCC)    • Coronary artery disease    • Diabetes (CMS/HCC)    • Diastolic dysfunction    • Disease of thyroid gland    • Elevated cholesterol    • Endometrial cancer (CMS/HCC) 2019   • History of brachytherapy 2020    vaginal brachytherapy   • Hyperlipidemia    • Hypertension    • Lupus anticoagulant disorder (CMS/HCC)    • PONV (postoperative nausea and vomiting)         Past Surgical History:   Procedure Laterality Date   • ABDOMINAL SURGERY     • CARDIAC CATHETERIZATION  2010    Dr. Gama- 30% Distal LAD   • CONVERTED (HISTORICAL) HOLTER  10/28/2019    - AVG 73. . Rare PAC   • ECHO - CONVERTED  2019    Dr. Reyez- EF 55%. LA- 3.9 Cm. Mild MR. RVSP- 33 mmHg.   • LAPAROSCOPIC TUBAL LIGATION     • NECK SURGERY     • NODE DISSECTION LAPAROSCOPIC N/A 2019    STAGING WITH DAVINCI ROBOT WITH LYMPH NODE DISSECTION;  Surgeon: Jennifer Holman MD;  Location: [facility];  Service: [facility]   • SKIN CANCER EXCISION     • TOTAL LAPAROSCOPIC HYSTERECTOMY SALPINGO  OOPHORECTOMY Bilateral 8/30/2019    TOTAL LAPAROSCOPIC HYSTERECTOMY BILATERAL SALPINGOOPHORECTOMY WITH DAVINCI ROBOT, BIOPSIES OF LEFT VULVAR SKIN LESIONS;  Surgeon: Jennifer Holman MD;  Location: [facility] OR;  Service: [facility]         Visit Dx:      ICD-10-CM ICD-9-CM   1. Bilateral leg edema R60.0 782.3   2. Endometrial cancer (CMS/HCC) C54.1 182.0   3. Shortness of breath R06.02 786.05       Lymphedema     Row Name 09/02/20 1500             Subjective Pain    Able to rate subjective pain?  yes  -BC      Pre-Treatment Pain Level  4  -BC      Subjective Pain Comment  L Leg/knee  -BC         Lymphedema Assessment    Lymphedema Classification  LLE:;Trunk:  -BC      Lymphedema Cancer Related Sx  left;hysterectomy  -BC      Lymph Nodes Removed #  8  -BC      Positive Lymph Nodes #  0  -BC      Chemo Received  no  -BC      Radiation Therapy Received  yes  -BC      Infections or Cellulitis?  no  -BC         Posture/Observations    Alignment Options  Scapular elevation  -BC      Scapular Elevation  Mild;Right:;Standing posture;Sitting posture  -BC      Posture/Observations Comments  Pain in L knee and lower back  -BC         Lymphedema Edema Assessment    Ptting Edema Category  By grade out of 4  -BC      Pitting Edema  + 2/4  -BC      Stemmer Sign  negative  -BC         Skin Changes/Observations    Location/Assessment  Lower Extremity  -BC      Lower Extremity Conditions  left:;clean;dry  -BC      Lower Extremity Color/Pigment  left:;blanchable  -BC      Skin Observations Comment  Slight discoloration on R foot medial to great toe.  -BC         Lymphedema Sensation    Lymphedema Sensation Reports  LLE:  -BC      Lymphedema Sensation Tests  light touch;deep touch  -BC      Lymphedema Light Touch  LLE:;WNL  -BC      Lymphedema Deep Touch  LLE:;mild impairment  -BC         Lymphedema Measurements    Measurement Type(s)  Quick Girth;Circumferential  -BC      Quick Girth Areas  Lower extremities  -BC      Circumferential  Areas  Trunk  -BC         LLE Quick Girth (cm)    Met-heads  24.4 cm  -BC      Mid foot  26.2 cm  -BC      Smallest ankle  23.6 cm  -BC      Largest calf  41.2 cm  -BC      Tib tuberosity  40 cm  -BC      Mid patella  50.3 cm  -BC      Distal thigh  56 cm  -BC      Other 1  26.3 cm  -BC      Other 2  36 cm  -BC         RLE Quick Girth (cm)    Met-heads  22.6 cm  -BC      Mid foot  23.8 cm  -BC      Smallest ankle  22.7 cm  -BC      Largest calf  40.5 cm  -BC      Tib tuberosity  39.8 cm  -BC      Mid patella  47.5 cm  -BC      Distal thigh  49.5 cm  -BC      Other 1  25.5 cm  -BC      Other 2  36.5 cm  -BC      RLE Quick Girth Total  308.4  -BC         Trunk Circumferential (cm)    Measurement Location 1  Navel  -BC      Measurement Location 2  below breast  -BC      Measurement Location 3  top of hip  -BC         Manual Lymphatic Drainage    Manual Lymphatic Drainage  extremity treatment;opened regional lymph nodes  -BC      Opened Regional Lymph Nodes  inguinal  -BC      Extremity Treatment  MLD to full limb  -BC      MLD to Full Limb  BLE's  -BC      Manual Therapy  MLD to BLE's, abd., inguinals  -BC         Compression/Skin Care    Compression/Skin Care  skin care;bandaging;wrapping location  -BC      Skin Care  moisturizing lotion applied  -BC      Wrapping Location  lower extremity  -BC      Wrapping Location LE  left: base of toes to mid thigh  -BC      Bandage Layers  cotton elastic stocking- single layer (comment size);short-stretch bandages (comment size/quantity);padding/fluff layer  -BC      Bandaging Technique  light compression;circumferential/spiral  -BC      Compression/Skin Care Comments  Compression pump x Abd., BLE  -BC        User Key  (r) = Recorded By, (t) = Taken By, (c) = Cosigned By    Initials Name Provider Type    Angie Camargo, OT Occupational Therapist                  OT Assessment/Plan     Row Name 09/02/20 5349          OT Assessment    Assessment Comments  Pt. positioned in  supported recline for manual lymph drainage, compression pump, skin care and multi layered bandaging of LLE, bandages in two sections for pt. comfort.  Pt. c/o pain in knee .   -BC       User Key  (r) = Recorded By, (t) = Taken By, (c) = Cosigned By    Initials Name Provider Type    BC Angie Coburn OT Occupational Therapist                                     Time Calculation:   OT Start Time: 1445  OT Stop Time: 1649  OT Time Calculation (min): 124 min  OT Non-Billable Time (min): 25 min     Therapy Charges for Today     Code Description Service Date Service Provider Modifiers Qty    06618125142 HC OT LYMPHEDEMA MANAGEMENT-15 MIN 9/2/2020 Angie Coburn OT KX 1    60275128233 HC OT MANUAL THERAPY EA 15 MIN 9/2/2020 Angie Coburn OT GO, KX 4    08928571585 HC OT SELF CARE/MGMT/TRAIN EA 15 MIN 9/2/2020 Angie Coburn OT GO, KX 2    83671036440 HC OT VASOPNEUMAT DEVIC 1 OR MORE AREAS 9/2/2020 Angie Coburn OT GO, KX 1                      Angie Coburn OT  9/2/2020

## 2020-09-09 ENCOUNTER — HOSPITAL ENCOUNTER (OUTPATIENT)
Dept: OCCUPATIONAL THERAPY | Facility: HOSPITAL | Age: 60
Setting detail: THERAPIES SERIES
Discharge: HOME | End: 2020-09-09

## 2020-09-09 DIAGNOSIS — F41.9 ANXIETY AND DEPRESSION: ICD-10-CM

## 2020-09-09 DIAGNOSIS — R06.02 SHORTNESS OF BREATH: ICD-10-CM

## 2020-09-09 DIAGNOSIS — C54.1 ENDOMETRIAL CANCER (HCC): ICD-10-CM

## 2020-09-09 DIAGNOSIS — R60.0 BILATERAL LEG EDEMA: Primary | ICD-10-CM

## 2020-09-09 DIAGNOSIS — F32.A ANXIETY AND DEPRESSION: ICD-10-CM

## 2020-09-09 PROCEDURE — 97139 UNLISTED THERAPEUTIC PX: CPT

## 2020-09-09 PROCEDURE — 97535 SELF CARE MNGMENT TRAINING: CPT

## 2020-09-09 PROCEDURE — 97140 MANUAL THERAPY 1/> REGIONS: CPT

## 2020-09-09 PROCEDURE — 97016 VASOPNEUMATIC DEVICE THERAPY: CPT

## 2020-09-09 NOTE — THERAPY TREATMENT NOTE
Outpatient Occupational Therapy Lymphedema Treatment Note  DANUTA Giles     Patient Name: Paty Marie  : 1960  MRN: [id number]  Today's Date: 2020      Visit Date: 2020    Patient Active Problem List   Diagnosis   • Lupus anticoagulant disorder (CMS/HCC)   • Aspirin long-term use   • Lichen sclerosus   • Endometrial cancer (CMS/HCC)   • Palpitations   • H/O lupus anticoagulant disorder   • Essential hypertension   • Type 2 diabetes mellitus without complication, without long-term current use of insulin (CMS/HCC)   • Hypercholesteremia   • Hypothyroidism   • Dizziness   • Precordial pain   • Bilateral leg edema   • Shortness of breath   • Metabolic syndrome   • Hypomagnesemia   • Blood clotting disorder (CMS/HCC)   • OP (osteoporosis)   • Fatigue   • Anxiety and depression   • Epigastric pain   • Nausea        Past Medical History:   Diagnosis Date   • Arrhythmia    • Arthritis    • Blood clotting disorder (CMS/HCC)    • Coronary artery disease    • Diabetes (CMS/HCC)    • Diastolic dysfunction    • Disease of thyroid gland    • Elevated cholesterol    • Endometrial cancer (CMS/HCC) 2019   • History of brachytherapy 2020    vaginal brachytherapy   • Hyperlipidemia    • Hypertension    • Lupus anticoagulant disorder (CMS/HCC)    • PONV (postoperative nausea and vomiting)         Past Surgical History:   Procedure Laterality Date   • ABDOMINAL SURGERY     • CARDIAC CATHETERIZATION  2010    Dr. Gama- 30% Distal LAD   • CONVERTED (HISTORICAL) HOLTER  10/28/2019    - AVG 73. . Rare PAC   • ECHO - CONVERTED  2019    Dr. Reyez- EF 55%. LA- 3.9 Cm. Mild MR. RVSP- 33 mmHg.   • LAPAROSCOPIC TUBAL LIGATION     • NECK SURGERY     • NODE DISSECTION LAPAROSCOPIC N/A 2019    STAGING WITH DAVINCI ROBOT WITH LYMPH NODE DISSECTION;  Surgeon: Jennifer Holman MD;  Location: [facility];  Service: [facility]   • SKIN CANCER EXCISION     • TOTAL LAPAROSCOPIC HYSTERECTOMY SALPINGO  OOPHORECTOMY Bilateral 8/30/2019    TOTAL LAPAROSCOPIC HYSTERECTOMY BILATERAL SALPINGOOPHORECTOMY WITH DAVINCI ROBOT, BIOPSIES OF LEFT VULVAR SKIN LESIONS;  Surgeon: Jennifer Holman MD;  Location: [facility] OR;  Service: [facility]         Visit Dx:      ICD-10-CM ICD-9-CM   1. Bilateral leg edema R60.0 782.3   2. Endometrial cancer (CMS/HCC) C54.1 182.0   3. Shortness of breath R06.02 786.05   4. Anxiety and depression F41.9 300.00    F32.9 311       Lymphedema     Row Name 09/09/20 1500             Subjective Pain    Able to rate subjective pain?  yes  -BC      Pre-Treatment Pain Level  4  -BC      Subjective Pain Comment  L knee  -BC         Subjective Comments    Subjective Comments  Pt. reports that she has had multiple episodes of chest pain that she feels is directly related to stressful events going on in her life at this time.  Pt. is wearing a heart monitor.   -BC         Lymphedema Assessment    Lymphedema Classification  LLE:;Trunk:  -BC      Lymphedema Cancer Related Sx  left;hysterectomy  -BC      Lymph Nodes Removed #  8  -BC      Positive Lymph Nodes #  0  -BC      Chemo Received  no  -BC      Radiation Therapy Received  yes  -BC      Infections or Cellulitis?  no  -BC         Posture/Observations    Alignment Options  Scapular elevation  -BC      Scapular Elevation  Mild;Right:;Standing posture;Sitting posture  -BC      Posture/Observations Comments  Pain in L knee and lower back  -BC         Lymphedema Edema Assessment    Ptting Edema Category  By grade out of 4  -BC      Pitting Edema  + 2/4  -BC      Stemmer Sign  negative  -BC         Skin Changes/Observations    Location/Assessment  Lower Extremity  -BC      Lower Extremity Conditions  left:;clean;dry  -BC      Lower Extremity Color/Pigment  left:;blanchable  -BC      Skin Observations Comment  Several bruises noted on RLE at thigh.  -BC         Lymphedema Sensation    Lymphedema Sensation Reports  LLE:  -BC      Lymphedema Sensation Tests  light  touch;deep touch  -BC      Lymphedema Light Touch  LLE:;WNL  -BC      Lymphedema Deep Touch  LLE:;mild impairment  -BC         Lymphedema Measurements    Measurement Type(s)  Quick Girth;Circumferential  -BC      Quick Girth Areas  Lower extremities  -BC      Circumferential Areas  Trunk  -BC         LLE Quick Girth (cm)    Met-heads  23.7 cm  -BC      Mid foot  25.3 cm  -BC      Smallest ankle  23 cm  -BC      Largest calf  39.8 cm  -BC      Tib tuberosity  39.9 cm  -BC      Mid patella  49 cm  -BC      Distal thigh  54 cm  -BC      Proximal thigh  65 cm  -BC      Other 1  24.4 cm  -BC      Other 2  34.7 cm  -BC         RLE Quick Girth (cm)    Met-heads  22.7 cm  -BC      Mid foot  25.3 cm  -BC      Smallest ankle  21.9 cm  -BC      Largest calf  40 cm  -BC      Tib tuberosity  38.3 cm  -BC      Mid patella  45.6 cm  -BC      Distal thigh  49.8 cm  -BC      Other 1  26.5 cm  -BC      Other 2  37.3 cm  -BC      RLE Quick Girth Total  307.4  -BC         Trunk Circumferential (cm)    Measurement Location 1  Navel  -BC      Measurement Location 2  below breast  -BC      Measurement Location 3  top of hip  -BC         Manual Lymphatic Drainage    Manual Lymphatic Drainage  extremity treatment;opened regional lymph nodes  -BC      Opened Regional Lymph Nodes  inguinal  -BC      Extremity Treatment  MLD to full limb  -BC      MLD to Full Limb  BLE's  -BC      Manual Therapy  MLD to BLE's, Abd., inguinals.  -BC         Compression/Skin Care    Compression/Skin Care  skin care;bandaging;wrapping location  -BC      Skin Care  moisturizing lotion applied  -BC      Wrapping Location  lower extremity  -BC      Wrapping Location LE  left: base of toes to mid thigh  -BC      Bandage Layers  cotton elastic stocking- single layer (comment size);short-stretch bandages (comment size/quantity);padding/fluff layer  -BC      Bandaging Technique  light compression;circumferential/spiral  -BC      Compression/Skin Care Comments   Compression pump x BLE's, No Abd., pump per pt. request due to rashy area on lower abdomen at skin fold.  -BC        User Key  (r) = Recorded By, (t) = Taken By, (c) = Cosigned By    Initials Name Provider Type    Angie Camargo OT Occupational Therapist                  OT Assessment/Plan     Row Name 09/09/20 0595          OT Assessment    Assessment Comments  Pt. positioned in supported recline for manual lymph drainage, compression pump, skin care and multi layered bandaging of LLE, bandages in two sections due to c/o pain in knee.   -BC       User Key  (r) = Recorded By, (t) = Taken By, (c) = Cosigned By    Initials Name Provider Type    Angie Camargo OT Occupational Therapist                    Therapy Education  Given: HEP, Symptoms/condition management, Edema management, Bandaging/dressing change  Program: Reinforced  How Provided: Verbal, Demonstration  Provided to: Patient  Level of Understanding: Verbalized                Time Calculation:   OT Start Time: 1430  OT Stop Time: 1600  OT Time Calculation (min): 90 min  OT Non-Billable Time (min): 25 min     Therapy Charges for Today     Code Description Service Date Service Provider Modifiers Qty    56486821370 HC OT MANUAL THERAPY EA 15 MIN 9/9/2020 Angie Coburn OT GO, KX 3    16993709745 HC OT LYMPHEDEMA MANAGEMENT-15 MIN 9/9/2020 Angie Coburn OT KX 1    52035472583 HC OT SELF CARE/MGMT/TRAIN EA 15 MIN 9/9/2020 Angie Coburn OT GO, KX 1    45618554691 HC OT VASOPNEUMAT DEVIC 1 OR MORE AREAS 9/9/2020 Angie Coburn OT GO, KX 1                      Angie Coburn OT  9/9/2020

## 2020-09-11 ENCOUNTER — HOSPITAL ENCOUNTER (OUTPATIENT)
Dept: OCCUPATIONAL THERAPY | Facility: HOSPITAL | Age: 60
Setting detail: THERAPIES SERIES
Discharge: HOME | End: 2020-09-11

## 2020-09-11 DIAGNOSIS — I89.0 LYMPHEDEMA: ICD-10-CM

## 2020-09-11 DIAGNOSIS — R60.0 BILATERAL LEG EDEMA: Primary | ICD-10-CM

## 2020-09-11 DIAGNOSIS — F41.9 ANXIETY AND DEPRESSION: ICD-10-CM

## 2020-09-11 DIAGNOSIS — R06.02 SHORTNESS OF BREATH: ICD-10-CM

## 2020-09-11 DIAGNOSIS — F32.A ANXIETY AND DEPRESSION: ICD-10-CM

## 2020-09-11 DIAGNOSIS — C54.1 ENDOMETRIAL CANCER (HCC): ICD-10-CM

## 2020-09-11 PROCEDURE — 97139 UNLISTED THERAPEUTIC PX: CPT

## 2020-09-11 PROCEDURE — 97016 VASOPNEUMATIC DEVICE THERAPY: CPT

## 2020-09-11 PROCEDURE — 97140 MANUAL THERAPY 1/> REGIONS: CPT

## 2020-09-11 NOTE — THERAPY TREATMENT NOTE
Outpatient Occupational Therapy Lymphedema Treatment Note  DANUTA Giles     Patient Name: Paty Marie  : 1960  MRN: [id number]  Today's Date: 2020      Visit Date: 2020    Patient Active Problem List   Diagnosis   • Lupus anticoagulant disorder (CMS/HCC)   • Aspirin long-term use   • Lichen sclerosus   • Endometrial cancer (CMS/HCC)   • Palpitations   • H/O lupus anticoagulant disorder   • Essential hypertension   • Type 2 diabetes mellitus without complication, without long-term current use of insulin (CMS/HCC)   • Hypercholesteremia   • Hypothyroidism   • Dizziness   • Precordial pain   • Bilateral leg edema   • Shortness of breath   • Metabolic syndrome   • Hypomagnesemia   • Blood clotting disorder (CMS/HCC)   • OP (osteoporosis)   • Fatigue   • Anxiety and depression   • Epigastric pain   • Nausea        Past Medical History:   Diagnosis Date   • Arrhythmia    • Arthritis    • Blood clotting disorder (CMS/HCC)    • Coronary artery disease    • Diabetes (CMS/HCC)    • Diastolic dysfunction    • Disease of thyroid gland    • Elevated cholesterol    • Endometrial cancer (CMS/HCC) 2019   • History of brachytherapy 2020    vaginal brachytherapy   • Hyperlipidemia    • Hypertension    • Lupus anticoagulant disorder (CMS/HCC)    • PONV (postoperative nausea and vomiting)         Past Surgical History:   Procedure Laterality Date   • ABDOMINAL SURGERY     • CARDIAC CATHETERIZATION  2010    Dr. Gama- 30% Distal LAD   • CONVERTED (HISTORICAL) HOLTER  10/28/2019    - AVG 73. . Rare PAC   • ECHO - CONVERTED  2019    Dr. Reyez- EF 55%. LA- 3.9 Cm. Mild MR. RVSP- 33 mmHg.   • LAPAROSCOPIC TUBAL LIGATION     • NECK SURGERY     • NODE DISSECTION LAPAROSCOPIC N/A 2019    STAGING WITH DAVINCI ROBOT WITH LYMPH NODE DISSECTION;  Surgeon: Jennifer Holman MD;  Location: [facility];  Service: [facility]   • SKIN CANCER EXCISION     • TOTAL LAPAROSCOPIC HYSTERECTOMY SALPINGO  OOPHORECTOMY Bilateral 8/30/2019    TOTAL LAPAROSCOPIC HYSTERECTOMY BILATERAL SALPINGOOPHORECTOMY WITH DAVINCI ROBOT, BIOPSIES OF LEFT VULVAR SKIN LESIONS;  Surgeon: Jennifer Holman MD;  Location: [facility] OR;  Service: [facility]         Visit Dx:      ICD-10-CM ICD-9-CM   1. Bilateral leg edema R60.0 782.3   2. Endometrial cancer (CMS/HCC) C54.1 182.0   3. Shortness of breath R06.02 786.05   4. Anxiety and depression F41.9 300.00    F32.9 311   5. Lymphedema I89.0 457.1       Lymphedema     Row Name 09/11/20 1300             Subjective Pain    Able to rate subjective pain?  yes  -AB      Pre-Treatment Pain Level  4  -AB      Subjective Pain Comment  L knee  -AB         Lymphedema Assessment    Lymphedema Classification  LLE:;Trunk:;stage 1 (Spontaneously Reversible);secondary secondary to cancer/radiation w/ family history  -AB      Lymphedema Cancer Related Sx  left;hysterectomy  -AB      Lymph Nodes Removed #  8  -AB      Positive Lymph Nodes #  0  -AB      Chemo Received  no  -AB      Radiation Therapy Received  yes  -AB      Infections or Cellulitis?  no  -AB         Posture/Observations    Alignment Options  Scapular elevation  -AB      Scapular Elevation  Mild;Right:;Standing posture;Sitting posture  -AB      Posture/Observations Comments  Pain in L knee and lower back  -AB         Lymphedema Edema Assessment    Ptting Edema Category  By grade out of 4  -AB      Pitting Edema  + 2/4  -AB      Stemmer Sign  negative  -AB         Skin Changes/Observations    Location/Assessment  Lower Extremity  -AB      Lower Extremity Conditions  left:;clean;dry  -AB      Lower Extremity Color/Pigment  left:;blanchable  -AB         Lymphedema Sensation    Lymphedema Sensation Reports  LLE:  -AB      Lymphedema Sensation Tests  light touch;deep touch  -AB      Lymphedema Light Touch  LLE:;WNL  -AB      Lymphedema Deep Touch  LLE:;mild impairment  -AB         Lymphedema Measurements    Measurement Type(s)  Quick  Girth;Circumferential  -AB      Quick Girth Areas  Lower extremities  -AB      Circumferential Areas  Trunk  -AB         LLE Quick Girth (cm)    Met-heads  23.1 cm  -AB      Mid foot  25.5 cm  -AB      Smallest ankle  23.6 cm  -AB      Largest calf  40.5 cm  -AB      Tib tuberosity  41.2 cm  -AB      Mid patella  49.3 cm  -AB      Distal thigh  55.6 cm  -AB      Proximal thigh  69.3 cm  -AB      Other 1  26 cm  -AB      Other 2  36.1 cm  -AB         RLE Quick Girth (cm)    Met-heads  23.2 cm  -AB      Mid foot  23.6 cm  -AB      Smallest ankle  22.8 cm  -AB      Largest calf  41.8 cm  -AB      Tib tuberosity  40.7 cm  -AB      Mid patella  46.8 cm  -AB      Distal thigh  54.4 cm  -AB      Proximal thigh  68.3 cm  -AB      Other 1  27.3 cm  -AB      Other 2  37.8 cm  -AB      RLE Quick Girth Total  386.7  -AB         Trunk Circumferential (cm)    Measurement Location 1  Navel  -AB      Measurement Location 2  below breast  -AB      Measurement Location 3  top of hip  -AB         Manual Lymphatic Drainage    Manual Lymphatic Drainage  extremity treatment;opened regional lymph nodes  -AB      Opened Regional Lymph Nodes  inguinal  -AB      Extremity Treatment  MLD to full limb  -AB      MLD to Full Limb  BLEs  -AB      Manual Therapy  MLD to BLE's, abdomen, axillary, inguinals  -AB         Compression/Skin Care    Compression/Skin Care  skin care;bandaging;wrapping location  -AB      Skin Care  moisturizing lotion applied  -AB      Wrapping Location  lower extremity  -AB      Wrapping Location LE  left: base of toes to mid thigh except knee  -AB      Bandage Layers  cotton elastic stocking- single layer (comment size);short-stretch bandages (comment size/quantity);padding/fluff layer  -AB      Bandaging Technique  light compression;circumferential/spiral  -AB      Compression/Skin Care Comments  compression pump to abdomen and BLE's  -AB        User Key  (r) = Recorded By, (t) = Taken By, (c) = Cosigned By    Initials  Name Provider Type    AB Kiara Copeland OT Occupational Therapist                                Therapy Education  Given: HEP, Symptoms/condition management, Edema management, Bandaging/dressing change  Program: Reinforced  How Provided: Verbal, Demonstration  Provided to: Patient  Level of Understanding: Verbalized                Time Calculation:   OT Start Time: 1320  OT Stop Time: 1430  OT Time Calculation (min): 70 min  OT Non-Billable Time (min): 25 min  Total Timed Code Minutes- OT: 70 minute(s)     Therapy Charges for Today     Code Description Service Date Service Provider Modifiers Qty    62154531062 HC OT VASOPNEUMAT DEVIC 1 OR MORE AREAS 9/11/2020 Kiara Copeland OT GO, KX 1    05704860972  OT MANUAL THERAPY EA 15 MIN 9/11/2020 Kiara Copeland OT GO KX 3    [id number]  OT LYMPHEDEMA MANAGEMENT-15 MIN 9/11/2020 Kiara Copeland OT KX 1                      Kiara Copeland OT  9/11/2020

## 2020-09-16 ENCOUNTER — HOSPITAL ENCOUNTER (OUTPATIENT)
Dept: OCCUPATIONAL THERAPY | Facility: HOSPITAL | Age: 60
Setting detail: THERAPIES SERIES
Discharge: HOME | End: 2020-09-16

## 2020-09-16 DIAGNOSIS — C54.1 ENDOMETRIAL CANCER (HCC): ICD-10-CM

## 2020-09-16 DIAGNOSIS — F41.9 ANXIETY AND DEPRESSION: ICD-10-CM

## 2020-09-16 DIAGNOSIS — R06.02 SHORTNESS OF BREATH: ICD-10-CM

## 2020-09-16 DIAGNOSIS — I89.0 LYMPHEDEMA: ICD-10-CM

## 2020-09-16 DIAGNOSIS — F32.A ANXIETY AND DEPRESSION: ICD-10-CM

## 2020-09-16 DIAGNOSIS — R60.0 BILATERAL LEG EDEMA: Primary | ICD-10-CM

## 2020-09-16 PROCEDURE — 97016 VASOPNEUMATIC DEVICE THERAPY: CPT

## 2020-09-16 PROCEDURE — 97140 MANUAL THERAPY 1/> REGIONS: CPT

## 2020-09-16 PROCEDURE — 97139 UNLISTED THERAPEUTIC PX: CPT

## 2020-09-16 NOTE — THERAPY TREATMENT NOTE
Outpatient Occupational Therapy Lymphedema Treatment Note  DANUTA Giles     Patient Name: Paty Marie  : 1960  MRN: [id number]  Today's Date: 2020      Visit Date: 2020    Patient Active Problem List   Diagnosis   • Lupus anticoagulant disorder (CMS/HCC)   • Aspirin long-term use   • Lichen sclerosus   • Endometrial cancer (CMS/HCC)   • Palpitations   • H/O lupus anticoagulant disorder   • Essential hypertension   • Type 2 diabetes mellitus without complication, without long-term current use of insulin (CMS/HCC)   • Hypercholesteremia   • Hypothyroidism   • Dizziness   • Precordial pain   • Bilateral leg edema   • Shortness of breath   • Metabolic syndrome   • Hypomagnesemia   • Blood clotting disorder (CMS/HCC)   • OP (osteoporosis)   • Fatigue   • Anxiety and depression   • Epigastric pain   • Nausea        Past Medical History:   Diagnosis Date   • Arrhythmia    • Arthritis    • Blood clotting disorder (CMS/HCC)    • Coronary artery disease    • Diabetes (CMS/HCC)    • Diastolic dysfunction    • Disease of thyroid gland    • Elevated cholesterol    • Endometrial cancer (CMS/HCC) 2019   • History of brachytherapy 2020    vaginal brachytherapy   • Hyperlipidemia    • Hypertension    • Lupus anticoagulant disorder (CMS/HCC)    • PONV (postoperative nausea and vomiting)         Past Surgical History:   Procedure Laterality Date   • ABDOMINAL SURGERY     • CARDIAC CATHETERIZATION  2010    Dr. Gama- 30% Distal LAD   • CONVERTED (HISTORICAL) HOLTER  10/28/2019    - AVG 73. . Rare PAC   • ECHO - CONVERTED  2019    Dr. Reyez- EF 55%. LA- 3.9 Cm. Mild MR. RVSP- 33 mmHg.   • LAPAROSCOPIC TUBAL LIGATION     • NECK SURGERY     • NODE DISSECTION LAPAROSCOPIC N/A 2019    STAGING WITH DAVINCI ROBOT WITH LYMPH NODE DISSECTION;  Surgeon: Jennifer Holman MD;  Location: [facility];  Service: [facility]   • SKIN CANCER EXCISION     • TOTAL LAPAROSCOPIC HYSTERECTOMY SALPINGO  OOPHORECTOMY Bilateral 8/30/2019    TOTAL LAPAROSCOPIC HYSTERECTOMY BILATERAL SALPINGOOPHORECTOMY WITH DAVINCI ROBOT, BIOPSIES OF LEFT VULVAR SKIN LESIONS;  Surgeon: Jennifer Holman MD;  Location: [facility] OR;  Service: [facility]         Visit Dx:      ICD-10-CM ICD-9-CM   1. Bilateral leg edema  R60.0 782.3   2. Endometrial cancer (CMS/HCC)  C54.1 182.0   3. Shortness of breath  R06.02 786.05   4. Anxiety and depression  F41.9 300.00    F32.9 311   5. Lymphedema  I89.0 457.1       Lymphedema     Row Name 09/16/20 1500             Subjective Pain    Able to rate subjective pain?  yes  -AB      Pre-Treatment Pain Level  4  -AB      Subjective Pain Comment  L knee  -AB         Subjective Comments    Subjective Comments  Pt. presents to treatment w/ no new c/o. She reports that she currently has a heart monitor at this time.   -AB         Lymphedema Assessment    Lymphedema Classification  LLE:;Trunk:;stage 1 (Spontaneously Reversible);secondary secondary to cancer/radiation w/ family history  -AB      Lymphedema Cancer Related Sx  left;hysterectomy  -AB      Lymph Nodes Removed #  8  -AB      Positive Lymph Nodes #  0  -AB      Chemo Received  no  -AB      Radiation Therapy Received  yes  -AB      Infections or Cellulitis?  no  -AB         Posture/Observations    Alignment Options  Scapular elevation  -AB      Scapular Elevation  Mild;Right:;Standing posture;Sitting posture  -AB      Posture/Observations Comments  Pain in L knee and lower back  -AB         Lymphedema Edema Assessment    Ptting Edema Category  By grade out of 4  -AB      Pitting Edema  + 2/4  -AB      Stemmer Sign  negative  -AB         Skin Changes/Observations    Location/Assessment  Lower Extremity  -AB      Lower Extremity Conditions  left:;clean;dry  -AB      Lower Extremity Color/Pigment  left:;blanchable  -AB         Lymphedema Sensation    Lymphedema Sensation Reports  LLE:  -AB      Lymphedema Sensation Tests  light touch;deep touch  -AB       Lymphedema Light Touch  LLE:;WNL  -AB      Lymphedema Deep Touch  LLE:;mild impairment  -AB         Lymphedema Measurements    Measurement Type(s)  Quick Girth;Circumferential  -AB      Quick Girth Areas  Lower extremities  -AB      Circumferential Areas  Trunk  -AB         LLE Quick Girth (cm)    Met-heads  23.8 cm  -AB      Mid foot  25 cm  -AB      Smallest ankle  23.4 cm  -AB      Largest calf  41.1 cm  -AB      Tib tuberosity  40.7 cm  -AB      Mid patella  48.8 cm  -AB      Distal thigh  55 cm  -AB      Proximal thigh  68.2 cm  -AB      Other 1  25.5 cm  -AB      Other 2  34.9 cm  -AB         RLE Quick Girth (cm)    Met-heads  23.3 cm  -AB      Mid foot  23.3 cm  -AB      Smallest ankle  23 cm  -AB      Largest calf  41.1 cm  -AB      Tib tuberosity  40 cm  -AB      Mid patella  46.5 cm  -AB      Distal thigh  56.1 cm  -AB      Proximal thigh  68.2 cm  -AB      Other 1  25.9 cm  -AB      Other 2  34.6 cm  -AB      RLE Quick Girth Total  382  -AB         Trunk Circumferential (cm)    Measurement Location 1  Navel  -AB      Measurement Location 2  below breast  -AB      Measurement Location 3  top of hip  -AB         Manual Lymphatic Drainage    Manual Lymphatic Drainage  extremity treatment;opened regional lymph nodes  -AB      Opened Regional Lymph Nodes  inguinal  -AB      Extremity Treatment  MLD to full limb  -AB      MLD to Full Limb  BLE's  -AB      Manual Therapy  MLD to BLE's, abdomen, inguinals  -AB         Compression/Skin Care    Compression/Skin Care  skin care  -AB      Skin Care  moisturizing lotion applied  -AB      Wrapping Location  lower extremity  -AB      Wrapping Location LE  --  -AB      Bandage Layers  --  -AB      Bandaging Comments  Pt. left bandages at home this date.   -AB      Bandaging Technique  --  -AB      Compression/Skin Care Comments  compression pump to abdomen and BLE's  -AB        User Key  (r) = Recorded By, (t) = Taken By, (c) = Cosigned By    Initials Name Provider Type     Kiara Esteban OT Occupational Therapist                                Therapy Education  Given: HEP, Edema management, Symptoms/condition management  Program: Reinforced  How Provided: Verbal, Demonstration  Provided to: Patient  Level of Understanding: Verbalized                Time Calculation:   OT Start Time: 1445  OT Stop Time: 1600  OT Time Calculation (min): 75 min  OT Non-Billable Time (min): 25 min  Total Timed Code Minutes- OT: 75 minute(s)     Therapy Charges for Today     Code Description Service Date Service Provider Modifiers Qty    65642221643 HC OT VASOPNEUMAT DEVIC 1 OR MORE AREAS 9/16/2020 Kiara Copealnd, OT GO, KX 1    97098019566 HC OT MANUAL THERAPY EA 15 MIN 9/16/2020 Kiara Copeland OT GO, KX 3    40278082892 HC OT LYMPHEDEMA MANAGEMENT-15 MIN 9/16/2020 Kaira Copeland OT KX 1                      Kiara Copeland OT  9/16/2020

## 2020-09-18 ENCOUNTER — HOSPITAL ENCOUNTER (OUTPATIENT)
Dept: OCCUPATIONAL THERAPY | Facility: HOSPITAL | Age: 60
Setting detail: THERAPIES SERIES
Discharge: HOME | End: 2020-09-18

## 2020-09-18 DIAGNOSIS — I89.0 LYMPHEDEMA: ICD-10-CM

## 2020-09-18 DIAGNOSIS — F32.A ANXIETY AND DEPRESSION: ICD-10-CM

## 2020-09-18 DIAGNOSIS — C54.1 ENDOMETRIAL CANCER (HCC): ICD-10-CM

## 2020-09-18 DIAGNOSIS — R60.0 BILATERAL LEG EDEMA: Primary | ICD-10-CM

## 2020-09-18 DIAGNOSIS — R06.02 SHORTNESS OF BREATH: ICD-10-CM

## 2020-09-18 DIAGNOSIS — F41.9 ANXIETY AND DEPRESSION: ICD-10-CM

## 2020-09-18 PROCEDURE — 97140 MANUAL THERAPY 1/> REGIONS: CPT

## 2020-09-18 PROCEDURE — 97139 UNLISTED THERAPEUTIC PX: CPT

## 2020-09-18 PROCEDURE — 97535 SELF CARE MNGMENT TRAINING: CPT

## 2020-09-18 PROCEDURE — 97016 VASOPNEUMATIC DEVICE THERAPY: CPT

## 2020-09-18 NOTE — THERAPY TREATMENT NOTE
Outpatient Occupational Therapy Lymphedema Treatment Note  DANUTA Giles     Patient Name: Paty Marie  : 1960  MRN: [id number]  Today's Date: 2020      Visit Date: 2020    Patient Active Problem List   Diagnosis   • Lupus anticoagulant disorder (CMS/HCC)   • Aspirin long-term use   • Lichen sclerosus   • Endometrial cancer (CMS/HCC)   • Palpitations   • H/O lupus anticoagulant disorder   • Essential hypertension   • Type 2 diabetes mellitus without complication, without long-term current use of insulin (CMS/HCC)   • Hypercholesteremia   • Hypothyroidism   • Dizziness   • Precordial pain   • Bilateral leg edema   • Shortness of breath   • Metabolic syndrome   • Hypomagnesemia   • Blood clotting disorder (CMS/HCC)   • OP (osteoporosis)   • Fatigue   • Anxiety and depression   • Epigastric pain   • Nausea        Past Medical History:   Diagnosis Date   • Arrhythmia    • Arthritis    • Blood clotting disorder (CMS/HCC)    • Coronary artery disease    • Diabetes (CMS/HCC)    • Diastolic dysfunction    • Disease of thyroid gland    • Elevated cholesterol    • Endometrial cancer (CMS/HCC) 2019   • History of brachytherapy 2020    vaginal brachytherapy   • Hyperlipidemia    • Hypertension    • Lupus anticoagulant disorder (CMS/HCC)    • PONV (postoperative nausea and vomiting)         Past Surgical History:   Procedure Laterality Date   • ABDOMINAL SURGERY     • CARDIAC CATHETERIZATION  2010    Dr. Gama- 30% Distal LAD   • CONVERTED (HISTORICAL) HOLTER  10/28/2019    - AVG 73. . Rare PAC   • ECHO - CONVERTED  2019    Dr. Reyez- EF 55%. LA- 3.9 Cm. Mild MR. RVSP- 33 mmHg.   • LAPAROSCOPIC TUBAL LIGATION     • NECK SURGERY     • NODE DISSECTION LAPAROSCOPIC N/A 2019    STAGING WITH DAVINCI ROBOT WITH LYMPH NODE DISSECTION;  Surgeon: Jennifer Holman MD;  Location: [facility];  Service: [facility]   • SKIN CANCER EXCISION     • TOTAL LAPAROSCOPIC HYSTERECTOMY SALPINGO  OOPHORECTOMY Bilateral 8/30/2019    TOTAL LAPAROSCOPIC HYSTERECTOMY BILATERAL SALPINGOOPHORECTOMY WITH DAVINCI ROBOT, BIOPSIES OF LEFT VULVAR SKIN LESIONS;  Surgeon: Jennifer Holman MD;  Location: [facility] OR;  Service: [facility]         Visit Dx:      ICD-10-CM ICD-9-CM   1. Bilateral leg edema  R60.0 782.3   2. Endometrial cancer (CMS/HCC)  C54.1 182.0   3. Shortness of breath  R06.02 786.05   4. Anxiety and depression  F41.9 300.00    F32.9 311   5. Lymphedema  I89.0 457.1       Lymphedema     Row Name 09/18/20 1500             Subjective Pain    Able to rate subjective pain?  yes  -BC      Pre-Treatment Pain Level  3  -BC      Post-Treatment Pain Level  2  -BC      Subjective Pain Comment  L knee  -BC         Subjective Comments    Subjective Comments  Pt. presents this date with no complaints voiced.    -BC         Lymphedema Assessment    Lymphedema Classification  LLE:;Trunk:;stage 1 (Spontaneously Reversible);secondary secondary to cancer/radiation w/ family history  -BC      Lymphedema Cancer Related Sx  left;hysterectomy  -BC      Lymph Nodes Removed #  8  -BC      Positive Lymph Nodes #  0  -BC      Chemo Received  no  -BC      Radiation Therapy Received  yes  -BC      Infections or Cellulitis?  no  -BC         Posture/Observations    Alignment Options  Scapular elevation  -BC      Scapular Elevation  Mild;Right:;Standing posture;Sitting posture  -BC      Posture/Observations Comments  Pain in L knee and lower back  -BC         Lymphedema Edema Assessment    Ptting Edema Category  By grade out of 4  -BC      Pitting Edema  + 2/4  -BC      Stemmer Sign  negative  -BC         Skin Changes/Observations    Location/Assessment  Lower Extremity  -BC      Lower Extremity Conditions  left:;clean;dry  -BC      Lower Extremity Color/Pigment  left:;blanchable  -BC         Lymphedema Sensation    Lymphedema Sensation Reports  LLE:  -BC      Lymphedema Sensation Tests  light touch;deep touch  -BC      Lymphedema Light  Touch  LLE:;WNL  -BC      Lymphedema Deep Touch  LLE:;mild impairment  -BC         Lymphedema Measurements    Measurement Type(s)  Quick Girth;Circumferential  -BC      Quick Girth Areas  Lower extremities  -BC      Circumferential Areas  Trunk  -BC         LLE Quick Girth (cm)    Met-heads  23.3 cm  -BC      Mid foot  24.3 cm  -BC      Smallest ankle  23.5 cm  -BC      Largest calf  39.8 cm  -BC      Tib tuberosity  38.7 cm  -BC      Mid patella  48 cm  -BC      Distal thigh  54 cm  -BC      Proximal thigh  63 cm  -BC      Other 1  24.3 cm  -BC      Other 2  34.6 cm  -BC         RLE Quick Girth (cm)    Met-heads  22 cm  -BC      Mid foot  24 cm  -BC      Smallest ankle  22 cm  -BC      Largest calf  41 cm  -BC      Tib tuberosity  39.5 cm  -BC      Mid patella  47 cm  -BC      Distal thigh  54 cm  -BC      Proximal thigh  65.5 cm  -BC      Other 1  25.4 cm  -BC      RLE Quick Girth Total  340.4  -BC         Trunk Circumferential (cm)    Measurement Location 1  Navel  -BC      Measurement Location 2  below breast  -BC      Measurement Location 3  top of hip  -BC         Manual Lymphatic Drainage    Manual Lymphatic Drainage  extremity treatment;opened regional lymph nodes  -BC      Opened Regional Lymph Nodes  inguinal  -BC      Extremity Treatment  MLD to full limb  -BC      MLD to Full Limb  BLE's  -BC      Manual Therapy  MLD to BLE's, Abd., inguinals  -BC         Compression/Skin Care    Compression/Skin Care  skin care  -BC      Skin Care  moisturizing lotion applied  -BC      Wrapping Location  lower extremity  -BC      Wrapping Location LE  bilateral:;toes to groin  -BC      Wrapping Comments  2 part bandaging  -BC      Bandage Layers  padding/fluff layer;cotton elastic stocking- single layer (comment size);short-stretch bandages (comment size/quantity)  -BC      Bandaging Comments  stockinette med base of toes to mid knee, large mid knee to groin.  -BC      Bandaging Technique  light compression  -BC       Compression/Skin Care Comments  Compression pump x Abd., BLE's  -BC        User Key  (r) = Recorded By, (t) = Taken By, (c) = Cosigned By    Initials Name Provider Type    Angie Camargo OT Occupational Therapist                  OT Assessment/Plan     Row Name 09/18/20 1617          OT Assessment    Assessment Comments  Pt. positioned in supported recline for manual lymph drainage, compression pump, skin care and multi layered bandaging of LLE, 2-part bandaging.  -BC       User Key  (r) = Recorded By, (t) = Taken By, (c) = Cosigned By    Initials Name Provider Type    BC Angie Coburn OT Occupational Therapist                    Therapy Education  Given: Symptoms/condition management  Program: Reinforced  How Provided: Verbal  Provided to: Patient  Level of Understanding: Verbalized                Time Calculation:   OT Start Time: 1430  OT Stop Time: 1600  OT Time Calculation (min): 90 min  OT Non-Billable Time (min): 20 min     Therapy Charges for Today     Code Description Service Date Service Provider Modifiers Qty    35677563692 HC OT MANUAL THERAPY EA 15 MIN 9/18/2020 Angie Coburn OT GO KX 3    67203639119 HC OT LYMPHEDEMA MANAGEMENT-15 MIN 9/18/2020 Angie Coburn OT KX 1    23900459035 HC OT SELF CARE/MGMT/TRAIN EA 15 MIN 9/18/2020 Angie Coburn OT GO KX 1    95484526345 HC OT VASOPNEUMAT DEVIC 1 OR MORE AREAS 9/18/2020 Angie Coburn OT GO KX 1                      Angie Coburn OT  9/18/2020

## 2020-09-23 ENCOUNTER — HOSPITAL ENCOUNTER (OUTPATIENT)
Dept: OCCUPATIONAL THERAPY | Facility: HOSPITAL | Age: 60
Setting detail: RECURRING SERIES
Discharge: HOME | End: 2020-09-23

## 2020-09-23 DIAGNOSIS — F41.9 ANXIETY AND DEPRESSION: ICD-10-CM

## 2020-09-23 DIAGNOSIS — F32.A ANXIETY AND DEPRESSION: ICD-10-CM

## 2020-09-23 DIAGNOSIS — R06.02 SHORTNESS OF BREATH: ICD-10-CM

## 2020-09-23 DIAGNOSIS — C54.1 ENDOMETRIAL CANCER (HCC): ICD-10-CM

## 2020-09-23 DIAGNOSIS — I89.0 LYMPHEDEMA: ICD-10-CM

## 2020-09-23 DIAGNOSIS — R60.0 BILATERAL LEG EDEMA: Primary | ICD-10-CM

## 2020-09-23 PROCEDURE — 97139 UNLISTED THERAPEUTIC PX: CPT

## 2020-09-23 PROCEDURE — 97140 MANUAL THERAPY 1/> REGIONS: CPT

## 2020-09-23 PROCEDURE — 97016 VASOPNEUMATIC DEVICE THERAPY: CPT

## 2020-09-23 PROCEDURE — 97535 SELF CARE MNGMENT TRAINING: CPT

## 2020-09-23 NOTE — THERAPY TREATMENT NOTE
Outpatient Occupational Therapy Lymphedema Treatment Note  DANUTA Giles     Patient Name: Paty Marie  : 1960  MRN: [id number]  Today's Date: 2020      Visit Date: 2020    Patient Active Problem List   Diagnosis   • Lupus anticoagulant disorder (CMS/HCC)   • Aspirin long-term use   • Lichen sclerosus   • Endometrial cancer (CMS/HCC)   • Palpitations   • H/O lupus anticoagulant disorder   • Essential hypertension   • Type 2 diabetes mellitus without complication, without long-term current use of insulin (CMS/HCC)   • Hypercholesteremia   • Hypothyroidism   • Dizziness   • Precordial pain   • Bilateral leg edema   • Shortness of breath   • Metabolic syndrome   • Hypomagnesemia   • Blood clotting disorder (CMS/HCC)   • OP (osteoporosis)   • Fatigue   • Anxiety and depression   • Epigastric pain   • Nausea        Past Medical History:   Diagnosis Date   • Arrhythmia    • Arthritis    • Blood clotting disorder (CMS/HCC)    • Coronary artery disease    • Diabetes (CMS/HCC)    • Diastolic dysfunction    • Disease of thyroid gland    • Elevated cholesterol    • Endometrial cancer (CMS/HCC) 2019   • History of brachytherapy 2020    vaginal brachytherapy   • Hyperlipidemia    • Hypertension    • Lupus anticoagulant disorder (CMS/HCC)    • PONV (postoperative nausea and vomiting)         Past Surgical History:   Procedure Laterality Date   • ABDOMINAL SURGERY     • CARDIAC CATHETERIZATION  2010    Dr. Gama- 30% Distal LAD   • CONVERTED (HISTORICAL) HOLTER  10/28/2019    - AVG 73. . Rare PAC   • ECHO - CONVERTED  2019    Dr. Reyez- EF 55%. LA- 3.9 Cm. Mild MR. RVSP- 33 mmHg.   • LAPAROSCOPIC TUBAL LIGATION     • NECK SURGERY     • NODE DISSECTION LAPAROSCOPIC N/A 2019    STAGING WITH DAVINCI ROBOT WITH LYMPH NODE DISSECTION;  Surgeon: Jennifer Holman MD;  [facility];  Service: [facility]   • SKIN CANCER EXCISION     • TOTAL LAPAROSCOPIC HYSTERECTOMY SALPINGO  OOPHORECTOMY Bilateral 8/30/2019    TOTAL LAPAROSCOPIC HYSTERECTOMY BILATERAL SALPINGOOPHORECTOMY WITH DAVINCI ROBOT, BIOPSIES OF LEFT VULVAR SKIN LESIONS;  Surgeon: Jennifer Holman MD;  Location: [facility] OR;  Service: [facility]         Visit Dx:      ICD-10-CM ICD-9-CM   1. Bilateral leg edema  R60.0 782.3   2. Endometrial cancer (CMS/HCC)  C54.1 182.0   3. Shortness of breath  R06.02 786.05   4. Anxiety and depression  F41.9 300.00    F32.9 311   5. Lymphedema  I89.0 457.1       Lymphedema     Row Name 09/23/20 1400             Subjective Pain    Able to rate subjective pain?  yes  -BC      Pre-Treatment Pain Level  4  -BC      Subjective Pain Comment  L knee  -BC         Subjective Comments    Subjective Comments  Pt. forgot bandages at home this date.   -BC         Lymphedema Assessment    Lymphedema Classification  LLE:;Trunk:;stage 1 (Spontaneously Reversible);secondary secondary to cancer/radiation w/ family history  -BC      Lymphedema Cancer Related Sx  left;hysterectomy  -BC      Lymph Nodes Removed #  8  -BC      Positive Lymph Nodes #  0  -BC      Chemo Received  no  -BC      Radiation Therapy Received  yes  -BC      Infections or Cellulitis?  no  -BC         Posture/Observations    Alignment Options  Scapular elevation  -BC      Scapular Elevation  Mild;Right:;Standing posture;Sitting posture  -BC      Posture/Observations Comments  Pain in L knee and lower back  -BC         Lymphedema Edema Assessment    Ptting Edema Category  By grade out of 4  -BC      Pitting Edema  + 2/4  -BC      Stemmer Sign  negative  -BC         Skin Changes/Observations    Location/Assessment  Lower Extremity  -BC      Lower Extremity Conditions  left:;clean;dry  -BC      Lower Extremity Color/Pigment  left:;blanchable  -BC         Lymphedema Sensation    Lymphedema Sensation Reports  LLE:  -BC      Lymphedema Sensation Tests  light touch;deep touch  -BC      Lymphedema Light Touch  LLE:;WNL  -BC      Lymphedema Deep Touch   LLE:;mild impairment  -BC         Lymphedema Measurements    Measurement Type(s)  Quick Girth;Circumferential  -BC      Quick Girth Areas  Lower extremities  -BC      Circumferential Areas  Trunk  -BC         LLE Quick Girth (cm)    Met-heads  23.7 cm  -BC      Mid foot  25.1 cm  -BC      Smallest ankle  23.5 cm  -BC      Largest calf  39.8 cm  -BC      Tib tuberosity  40 cm  -BC      Mid patella  49.8 cm  -BC      Distal thigh  55.8 cm  -BC      Proximal thigh  65 cm  -BC      Other 1  24.9 cm  -BC      Other 2  34.3 cm  -BC         RLE Quick Girth (cm)    Met-heads  22.2 cm  -BC      Mid foot  23.6 cm  -BC      Smallest ankle  22.8 cm  -BC      Largest calf  41.3 cm  -BC      Tib tuberosity  39.2 cm  -BC      Mid patella  47.3 cm  -BC      Distal thigh  52.3 cm  -BC      Proximal thigh  66 cm  -BC      Other 1  27.1 cm  -BC      Other 2  38.2 cm  -BC      RLE Quick Girth Total  380  -BC         Trunk Circumferential (cm)    Measurement Location 1  Navel  -BC      Measurement Location 2  below breast  -BC      Measurement Location 3  top of hip  -BC         Manual Lymphatic Drainage    Manual Lymphatic Drainage  extremity treatment;opened regional lymph nodes  -BC      Opened Regional Lymph Nodes  inguinal  -BC      Extremity Treatment  MLD to full limb  -BC      MLD to Full Limb  BLE's  -BC      Manual Therapy  MLD to BLE's, Abd., inguinals, axillary.  -BC         Compression/Skin Care    Compression/Skin Care  skin care  -BC      Skin Care  moisturizing lotion applied  -BC      Wrapping Location  --  -BC      Wrapping Location LE  --  -BC      Bandage Layers  --  -BC      Bandaging Technique  --  -BC      Compression/Skin Care Comments  Compression pump x Abd., BLE's.  -BC        User Key  (r) = Recorded By, (t) = Taken By, (c) = Cosigned By    Initials Name Provider Type    Angie Camargo OT Occupational Therapist                  OT Assessment/Plan     Row Name 09/23/20 5679          OT Assessment     Assessment Comments  Pt. positioned in supine for manual lymph drainage, compression pump, skin care and education.  Pt. had other appointments and did not bring bandages with her. Pt. did not want bandages on this date.  -BC       User Key  (r) = Recorded By, (t) = Taken By, (c) = Cosigned By    Initials Name Provider Type    BC Angie Coburn OT Occupational Therapist                    Therapy Education  Given: Edema management, Bandaging/dressing change  Program: Reinforced  How Provided: Verbal  Provided to: Patient  Level of Understanding: Verbalized                Time Calculation:   OT Start Time: 1430  OT Stop Time: 1600  OT Time Calculation (min): 90 min  OT Non-Billable Time (min): 20 min     Therapy Charges for Today     Code Description Service Date Service Provider Modifiers Qty    16039107018 HC OT LYMPHEDEMA MANAGEMENT-15 MIN 9/23/2020 Angie Coburn OT KX 1    12445915269 HC OT MANUAL THERAPY EA 15 MIN 9/23/2020 Angie Coburn OT GO, KX 3    85554358152 HC OT SELF CARE/MGMT/TRAIN EA 15 MIN 9/23/2020 Angie Coburn OT GO, KX 1    39171492309 HC OT VASOPNEUMAT DEVIC 1 OR MORE AREAS 9/23/2020 Angie Coburn OT GO, KX 1                      Angie Coburn OT  9/23/2020

## 2020-09-25 ENCOUNTER — HOSPITAL ENCOUNTER (OUTPATIENT)
Dept: OCCUPATIONAL THERAPY | Facility: HOSPITAL | Age: 60
Setting detail: RECURRING SERIES
Discharge: HOME | End: 2020-09-25

## 2020-09-25 DIAGNOSIS — R60.0 BILATERAL LEG EDEMA: Primary | ICD-10-CM

## 2020-09-25 DIAGNOSIS — C54.1 ENDOMETRIAL CANCER (HCC): ICD-10-CM

## 2020-09-25 DIAGNOSIS — I89.0 LYMPHEDEMA: ICD-10-CM

## 2020-09-25 DIAGNOSIS — R06.02 SHORTNESS OF BREATH: ICD-10-CM

## 2020-09-25 DIAGNOSIS — F41.9 ANXIETY AND DEPRESSION: ICD-10-CM

## 2020-09-25 DIAGNOSIS — F32.A ANXIETY AND DEPRESSION: ICD-10-CM

## 2020-09-25 PROCEDURE — 97140 MANUAL THERAPY 1/> REGIONS: CPT

## 2020-09-25 PROCEDURE — 97139 UNLISTED THERAPEUTIC PX: CPT

## 2020-09-25 PROCEDURE — 97016 VASOPNEUMATIC DEVICE THERAPY: CPT

## 2020-10-07 ENCOUNTER — HOSPITAL ENCOUNTER (OUTPATIENT)
Dept: OCCUPATIONAL THERAPY | Facility: HOSPITAL | Age: 60
Setting detail: THERAPIES SERIES
Discharge: HOME | End: 2020-10-07

## 2020-10-07 DIAGNOSIS — F41.9 ANXIETY AND DEPRESSION: ICD-10-CM

## 2020-10-07 DIAGNOSIS — F32.A ANXIETY AND DEPRESSION: ICD-10-CM

## 2020-10-07 DIAGNOSIS — R06.02 SHORTNESS OF BREATH: ICD-10-CM

## 2020-10-07 DIAGNOSIS — C54.1 ENDOMETRIAL CANCER (HCC): ICD-10-CM

## 2020-10-07 DIAGNOSIS — R60.0 BILATERAL LEG EDEMA: Primary | ICD-10-CM

## 2020-10-07 DIAGNOSIS — I89.0 LYMPHEDEMA: ICD-10-CM

## 2020-10-07 PROCEDURE — 97016 VASOPNEUMATIC DEVICE THERAPY: CPT

## 2020-10-07 PROCEDURE — 97139 UNLISTED THERAPEUTIC PX: CPT

## 2020-10-07 PROCEDURE — 97140 MANUAL THERAPY 1/> REGIONS: CPT

## 2020-10-07 NOTE — THERAPY TREATMENT NOTE
Outpatient Occupational Therapy Lymphedema Treatment Note  DANUTA Giles     Patient Name: Paty Marie  : 1960  MRN: [id number]  Today's Date: 10/7/2020      Visit Date: 10/07/2020    Patient Active Problem List   Diagnosis   • Lupus anticoagulant disorder (CMS/HCC)   • Aspirin long-term use   • Lichen sclerosus   • Endometrial cancer (CMS/HCC)   • Palpitations   • H/O lupus anticoagulant disorder   • Essential hypertension   • Type 2 diabetes mellitus without complication, without long-term current use of insulin (CMS/HCC)   • Hypercholesteremia   • Hypothyroidism   • Dizziness   • Precordial pain   • Bilateral leg edema   • Shortness of breath   • Metabolic syndrome   • Hypomagnesemia   • Blood clotting disorder (CMS/HCC)   • OP (osteoporosis)   • Fatigue   • Anxiety and depression   • Epigastric pain   • Nausea        Past Medical History:   Diagnosis Date   • Arrhythmia    • Arthritis    • Blood clotting disorder (CMS/HCC)    • Coronary artery disease    • Diabetes (CMS/HCC)    • Diastolic dysfunction    • Disease of thyroid gland    • Elevated cholesterol    • Endometrial cancer (CMS/HCC) 2019   • History of brachytherapy 2020    vaginal brachytherapy   • Hyperlipidemia    • Hypertension    • Lupus anticoagulant disorder (CMS/HCC)    • PONV (postoperative nausea and vomiting)         Past Surgical History:   Procedure Laterality Date   • ABDOMINAL SURGERY     • CARDIAC CATHETERIZATION  2010    Dr. Gama- 30% Distal LAD   • CONVERTED (HISTORICAL) HOLTER  10/28/2019    - AVG 73. . Rare PAC   • ECHO - CONVERTED  2019    Dr. Reyez- EF 55%. LA- 3.9 Cm. Mild MR. RVSP- 33 mmHg.   • LAPAROSCOPIC TUBAL LIGATION     • NECK SURGERY     • NODE DISSECTION LAPAROSCOPIC N/A 2019    STAGING WITH DAVINCI ROBOT WITH LYMPH NODE DISSECTION;  Surgeon: Jennifer Holman MD;  Location: [facility];  Service: [facility]   • SKIN CANCER EXCISION     • TOTAL LAPAROSCOPIC HYSTERECTOMY SALPINGO  OOPHORECTOMY Bilateral 8/30/2019    TOTAL LAPAROSCOPIC HYSTERECTOMY BILATERAL SALPINGOOPHORECTOMY WITH DAVINCI ROBOT, BIOPSIES OF LEFT VULVAR SKIN LESIONS;  Surgeon: Jennifer Holman MD;  Location: [facility] OR;  Service: [facility]         Visit Dx:      ICD-10-CM ICD-9-CM   1. Bilateral leg edema  R60.0 782.3   2. Endometrial cancer (CMS/HCC)  C54.1 182.0   3. Shortness of breath  R06.02 786.05   4. Anxiety and depression  F41.9 300.00    F32.9 311   5. Lymphedema  I89.0 457.1       Lymphedema     Row Name 10/07/20 1300             Subjective Pain    Able to rate subjective pain?  yes  -AB      Pre-Treatment Pain Level  4  -AB      Subjective Pain Comment  LLE  -AB         Subjective Comments    Subjective Comments  Pt. presents to therapy w/ no new c/o this date. She does report that she was stung by a bumble bee last week and had an allergic reaction.   -AB         Lymphedema Assessment    Lymphedema Classification  LLE:;Trunk:;stage 1 (Spontaneously Reversible);secondary secondary to cancer/radiation w/ family history  -AB      Lymphedema Cancer Related Sx  left;hysterectomy  -AB      Lymph Nodes Removed #  8  -AB      Positive Lymph Nodes #  0  -AB      Chemo Received  no  -AB      Radiation Therapy Received  yes  -AB      Infections or Cellulitis?  no  -AB         Posture/Observations    Alignment Options  Scapular elevation  -AB      Scapular Elevation  Mild;Right:;Standing posture;Sitting posture  -AB         Lymphedema Edema Assessment    Ptting Edema Category  By grade out of 4  -AB      Pitting Edema  + 1/4 (+1 of 4);+ 2/4;Mild  -AB      Stemmer Sign  negative  -AB         Skin Changes/Observations    Location/Assessment  Lower Extremity  -AB      Lower Extremity Conditions  left:;clean;dry  -AB      Lower Extremity Color/Pigment  left:;blanchable;hyperpigmented  -AB         Lymphedema Sensation    Lymphedema Sensation Reports  LLE:  -AB      Lymphedema Sensation Tests  light touch;deep touch  -AB       Lymphedema Light Touch  LLE:;WNL  -AB      Lymphedema Deep Touch  LLE:;mild impairment  -AB         Lymphedema Measurements    Measurement Type(s)  Quick Girth;Circumferential  -AB      Quick Girth Areas  Lower extremities  -AB      Circumferential Areas  Trunk  -AB         LLE Quick Girth (cm)    Met-heads  22.8 cm  -AB      Mid foot  24.3 cm  -AB      Smallest ankle  23.4 cm  -AB      Largest calf  41 cm  -AB      Tib tuberosity  40.3 cm  -AB      Mid patella  48.3 cm  -AB      Distal thigh  55.2 cm  -AB      Proximal thigh  68.5 cm  -AB      Other 1  24.9 cm  -AB      Other 2  35.8 cm  -AB         RLE Quick Girth (cm)    Met-heads  22.2 cm  -AB      Mid foot  22.5 cm  -AB      Smallest ankle  22.3 cm  -AB      Largest calf  40.8 cm  -AB      Tib tuberosity  39.8 cm  -AB      Mid patella  44.9 cm  -AB      Distal thigh  52 cm  -AB      Proximal thigh  64.4 cm  -AB      Other 1  26.4 cm  -AB      Other 2  37.4 cm  -AB      RLE Quick Girth Total  372.7  -AB         Trunk Circumferential (cm)    Measurement Location 1  Navel  -AB      Measurement Location 2  below breast  -AB      Measurement Location 3  top of hip  -AB         Manual Lymphatic Drainage    Manual Lymphatic Drainage  extremity treatment;opened regional lymph nodes  -AB      Opened Regional Lymph Nodes  inguinal  -AB      Extremity Treatment  MLD to full limb  -AB      MLD to Full Limb  BLE's  -AB      Manual Therapy  MLD to BLE's, abdomen, inguinals  -AB         Compression/Skin Care    Compression/Skin Care  skin care  -AB      Skin Care  moisturizing lotion applied  -AB      Wrapping Location  lower extremity  -AB      Wrapping Location LE  left: base of toes to mid thigh except knee per pt. request  -AB      Bandage Layers  padding/fluff layer;short-stretch bandages (comment size/quantity);cotton elastic stocking- single layer (comment size) artiflex per pt. request  -AB      Bandaging Technique  circumferential/spiral;light compression  -AB       Compression/Skin Care Comments  compression pump to abdomen and BLE's  -AB        User Key  (r) = Recorded By, (t) = Taken By, (c) = Cosigned By    Initials Name Provider Type    Kiara Esteban OT Occupational Therapist                                Therapy Education  Given: HEP, Edema management, Symptoms/condition management, Bandaging/dressing change  Program: Reinforced  How Provided: Verbal, Demonstration  Provided to: Patient  Level of Understanding: Verbalized                Time Calculation:   OT Start Time: 1315  OT Stop Time: 1440  OT Time Calculation (min): 85 min  OT Non-Billable Time (min): 25 min  Total Timed Code Minutes- OT: 85 minute(s)     Therapy Charges for Today     Code Description Service Date Service Provider Modifiers Qty    12239631076  OT MANUAL THERAPY EA 15 MIN 10/7/2020 Kiara Copeland OT GO, KX 4    19534581795  OT LYMPHEDEMA MANAGEMENT-15 MIN 10/7/2020 Kiara Copeland OT KX 1    [id number]  OT VASOPNEUMAT DEVIC 1 OR MORE AREAS 10/7/2020 Kiara Copeland OT GO KX 1                      Kiara Copeland OT  10/7/2020

## 2020-10-14 ENCOUNTER — HOSPITAL ENCOUNTER (OUTPATIENT)
Dept: OCCUPATIONAL THERAPY | Facility: HOSPITAL | Age: 60
Setting detail: THERAPIES SERIES
Discharge: HOME | End: 2020-10-14

## 2020-10-21 ENCOUNTER — APPOINTMENT (OUTPATIENT)
Dept: OCCUPATIONAL THERAPY | Facility: HOSPITAL | Age: 60
End: 2020-10-21

## 2020-10-28 ENCOUNTER — HOSPITAL ENCOUNTER (OUTPATIENT)
Dept: OCCUPATIONAL THERAPY | Facility: HOSPITAL | Age: 60
Setting detail: THERAPIES SERIES
Discharge: HOME | End: 2020-10-28

## 2020-11-12 ENCOUNTER — DOCUMENTATION (OUTPATIENT)
Dept: OCCUPATIONAL THERAPY | Facility: HOSPITAL | Age: 60
End: 2020-11-12

## 2020-11-12 NOTE — SIGNIFICANT NOTE
11/12/20 1237   OP OT Discharge Summary   Date of Discharge 11/12/20   Reason for Discharge Unable to pay/insurance denied care   Outcomes Achieved Patient able to partially acheive established goals   Discharge Destination Home with home program   Discharge Instructions Pt. advised of insurance denial and requests d/c at this time due to inability to pay out of pocket for treatment.

## 2021-01-06 ENCOUNTER — TELEPHONE (OUTPATIENT)
Dept: GYNECOLOGIC ONCOLOGY | Facility: CLINIC | Age: 61
End: 2021-01-06

## 2021-01-06 NOTE — TELEPHONE ENCOUNTER
Caller: JONA MONTES    Relationship to patient: SELF    Best call back number: [phone]    Chief complaint: PT NEEDS TO MAKE A FU APT,     Type of visit: F/U    Requested date: ANY DAY OR TIME

## 2021-01-27 ENCOUNTER — TELEPHONE (OUTPATIENT)
Dept: GASTROENTEROLOGY | Facility: CLINIC | Age: 61
End: 2021-01-27

## 2021-01-27 NOTE — TELEPHONE ENCOUNTER
Patient is requesting to have ultrasound for gallbladder done. She had to cancel back in June and July, but she said due to her cancer she was too sick to keep appointment. She now feels that she is having issues concerning her gallbladder and would like to have the ultrasound.

## 2021-01-29 ENCOUNTER — TELEPHONE (OUTPATIENT)
Dept: GASTROENTEROLOGY | Facility: CLINIC | Age: 61
End: 2021-01-29

## 2021-01-29 DIAGNOSIS — R11.0 NAUSEA: ICD-10-CM

## 2021-01-29 DIAGNOSIS — R10.13 EPIGASTRIC ABDOMINAL PAIN: Primary | ICD-10-CM

## 2021-02-02 ENCOUNTER — OFFICE VISIT (OUTPATIENT)
Dept: GYNECOLOGIC ONCOLOGY | Facility: CLINIC | Age: 61
End: 2021-02-02

## 2021-02-02 VITALS
WEIGHT: 201 LBS | HEART RATE: 54 BPM | BODY MASS INDEX: 33.49 KG/M2 | RESPIRATION RATE: 18 BRPM | TEMPERATURE: 96.6 F | HEIGHT: 65 IN | DIASTOLIC BLOOD PRESSURE: 80 MMHG | SYSTOLIC BLOOD PRESSURE: 169 MMHG | OXYGEN SATURATION: 98 %

## 2021-02-02 DIAGNOSIS — C54.1 ENDOMETRIAL CANCER (HCC): Primary | ICD-10-CM

## 2021-02-02 DIAGNOSIS — G89.29 CHRONIC PAIN OF LEFT KNEE: ICD-10-CM

## 2021-02-02 DIAGNOSIS — I89.0 LYMPHEDEMA OF LEFT LEG: ICD-10-CM

## 2021-02-02 DIAGNOSIS — M25.562 CHRONIC PAIN OF LEFT KNEE: ICD-10-CM

## 2021-02-02 DIAGNOSIS — K59.00 CONSTIPATION, UNSPECIFIED CONSTIPATION TYPE: ICD-10-CM

## 2021-02-02 PROBLEM — N90.89 VULVAR IRRITATION: Status: ACTIVE | Noted: 2021-02-02

## 2021-02-02 PROCEDURE — 99215 OFFICE O/P EST HI 40 MIN: CPT | Performed by: NURSE PRACTITIONER

## 2021-02-02 RX ORDER — CLOBETASOL PROPIONATE 0.5 MG/G
CREAM TOPICAL 2 TIMES DAILY PRN
Qty: 60 G | Refills: 2 | Status: SHIPPED | OUTPATIENT
Start: 2021-02-02 | End: 2021-12-20 | Stop reason: SDUPTHER

## 2021-02-02 RX ORDER — DOCUSATE SODIUM 100 MG/1
100 CAPSULE, LIQUID FILLED ORAL 2 TIMES DAILY
Qty: 180 CAPSULE | Refills: 3 | Status: SHIPPED | OUTPATIENT
Start: 2021-02-02 | End: 2022-02-02

## 2021-02-02 NOTE — PROGRESS NOTES
GYN ONCOLOGY CANCER SURVEILLANCE FOLLOW-UP    Paty Marie  [id number]  1960    Subjective   Chief Complaint: Follow-up (c/o chronic vulvar irritation, c/o left knee pain, wants referral back to PT)        History of present illness:     Paty Marie is a 60 y.o. year old female who is here today for ongoing surveillance of Endometrial Cancer, see Cancer History. She missed her last 3 month cancer surveillance visit. She is now approaching 1 year since completion of treatment. She c/o LLE lymphedema and pain her left leg. She was seeing PT lymphedema clinic at [facility] until 10/2020, requests new referral to resume. She reports she has seen orthopedics for her left knee pain and was told this is d/t arthritis. She does not have any follow-ups with ortho scheduled. Patient states ibuprofen helps, but she uses this very sparingly due to risk of GI side effects.   Patient c/o ongoing constipation since hysterectomy. Colace is helpful, refills requested. She also c/o vulvar irritation, chronic. She has used clobetasol topical PRN as prescribed by primary GYN, but is out of this medication, requests refills. She denies bleeding, new lesions, vulvar pain, vaginal pain, or pelvic pain.        Cancer History:   Oncology/Hematology History   Endometrial cancer (CMS/HCC)   6/26/2019 Biopsy    EMB by Dr. Juju Chadwick for postmenopausal bleeding with ultrasound showing endometrium thickened to 8.8 mm.   Pathology showed complex atypical hyperplasia. Referred to Gyn Oncology     8/30/2019 Surgery    RTLH/BSO and left vulvar biopsies.     Pathology showed 4.0 x 3.0 x 1.0 cm grade 1 endometrioid adenocarcinoma, invasive into 11/18 mm myometrium (>50%). Vulvar biopsies benign. Lymphvascular space invasion positive. MSI testing normal.      9/19/2019 Imaging    CT abdomen pelvis negative for metastatic disease     11/22/2019 Surgery    Lymph node dissection and staging with robot. All nodes negative.   Final Stage IB  "grade 1     2/3/2020 - 2/12/2020 Radiation    Radiation OncologyTreatment Course:  Paty Marie received 3000 cGy in 5 fractions to vagina via High Dose Radiation - HDR.     5/19/2020 Survivorship    Survivorship Care Plan completed and discussed with patient.  Copy of Survivorship Care Plan provided to patient and primary care provider.           The current medication list and allergy list were reviewed and reconciled.     Past Medical History, Past Surgical History, Social History, Family History have been reviewed and are without significant changes except as mentioned.      Review of Systems   Constitutional: Negative.    Cardiovascular: Positive for leg swelling (LLE lymphedema).   Gastrointestinal: Negative.    Genitourinary:        C/o chronic vulvar irritation   Musculoskeletal: Positive for arthralgias (left leg/knee).         Objective   Physical Exam  Vital Signs: /80 Comment: LUE  Pulse 54   Temp 96.6 °F (35.9 °C) (Infrared)   Resp 18   Ht 165.1 cm (65\")   Wt 91.2 kg (201 lb)   SpO2 98% Comment: RA  BMI 33.45 kg/m²   Vitals:    02/02/21 1446   PainSc:   5   PainLoc: Leg  Comment: Left           General Appearance:  alert, cooperative, no apparent distress, appears stated age and obese   Neurologic/Psychiatric: A&O x 3, gait steady, appropriate affect   HEENT:  Normocephalic, without obvious abnormality, mucous membranes moist   Abdomen:   Soft, non-tender, non-distended and no organomegaly   Extremities: Atraumatic, no clubbing, no cyanosis. BLE edema, left > right, no pitting edema appreciated today. Limited ROM in left knee.    Lymph nodes: No cervical, supraclavicular, inguinal adenopathy noted   Pelvic: External Genitalia  without lesions or skin changes  Vagina  is pink, moist, without lesions.   Vaginal Cuff  Female Vaginal Cuff: smooth, intact, without visible lesions and changes consistent with previous radiation  Uterus  surgically absent and no palpable masses  Ovaries  " surgically absent bilaterally  Parametria  smooth  Rectovaginal  Female rectovaginal: deferred     ECOG score: 1     Karnofsky score: 90       Result Review :   Last imaging study was CT abdomen pelvis 9/19/2019.       Procedure Note:  No notes on file         Assessment and Plan:    Diagnoses and all orders for this visit:    1. Endometrial cancer (CMS/HCC) (Primary)    2. Constipation, unspecified constipation type  -     docusate sodium (COLACE) 100 MG capsule; Take 1 capsule by mouth 2 (Two) Times a Day.  Dispense: 180 capsule; Refill: 3    3. Lymphedema of left leg  -     Ambulatory Referral to Physical Therapy Lymphedema (left knee pain), Evaluate and treat    4. Chronic pain of left knee  -     Ambulatory Referral to Physical Therapy Lymphedema (left knee pain), Evaluate and treat    Other orders  -     clobetasol (TEMOVATE) 0.05 % cream; Apply  topically to the appropriate area as directed 2 (Two) Times a Day As Needed (itching/irritation).  Dispense: 60 g; Refill: 2  -     Diclofenac Sodium (VOLTAREN) 1 % gel gel; Apply 4 g topically to the appropriate area as directed 4 (Four) Times a Day As Needed (Knee pain).  Dispense: 100 g; Refill: 1        There is no evidence of disease upon today's exam. Continue every 3 month cancer surveillance visits for the first 2 years. Reviewed the importance of pelvic exams and compliance with follow-ups in the surveillance setting . She is understanding to call with any changes in pelvic symptoms or general GYN concerns at any time between regularly scheduled visits.     Discussed patient's history of chronic vulvar irritation, possibly related to underlying early lichen sclerosus. No suspicious lesions or skin changes. She has used clobetasol PRN in the past, I will give new prescription but encouraged to use this sparingly. We reviewed medication instructions, risks, benefits, and potential side effects.     New referral for PT for the new year ordered for lymphedema  management and evaluation/management of right knee pain. Patient reports ibuprofen is helpful for pain, but is concerned about GI side effects of taking this often. I recommended topical Voltaren to be applied locally PRN. We reviewed medication instructions, risks, benefits, and potential side effects. Rx sent to pharmacy. If pain is persistent, patient encouraged to follow-up with orthopedics.     Patient encouraged to continue colace BID and increase daily water intake for management of constipation. May use Miralax OTC PRN.     Pain assessment was performed today as a part of patient’s care.  For patients with pain related to surgery, gynecologic malignancy or cancer treatment, the plan is as noted in the assessment/plan.  For patients with pain not related to these issues, they are to seek any further needed care from a more appropriate provider, such as PCP or ortho. See above.     This was a 40 minute visit including both face-to-face and non-face-to-face time on the date of service.       Follow-up:     Return to clinic in 3 months for ongoing cancer surveillance.      Electronically signed by MARK Doll on 02/03/21 at 15:32 EST

## 2021-02-11 ENCOUNTER — APPOINTMENT (OUTPATIENT)
Dept: ULTRASOUND IMAGING | Facility: HOSPITAL | Age: 61
End: 2021-02-11

## 2021-02-11 ENCOUNTER — HOSPITAL ENCOUNTER (OUTPATIENT)
Dept: ULTRASOUND IMAGING | Facility: HOSPITAL | Age: 61
End: 2021-02-11

## 2021-02-12 ENCOUNTER — HOSPITAL ENCOUNTER (OUTPATIENT)
Dept: ULTRASOUND IMAGING | Facility: HOSPITAL | Age: 61
End: 2021-02-12

## 2021-02-15 ENCOUNTER — APPOINTMENT (OUTPATIENT)
Dept: ULTRASOUND IMAGING | Facility: HOSPITAL | Age: 61
End: 2021-02-15

## 2021-02-16 ENCOUNTER — APPOINTMENT (OUTPATIENT)
Dept: ULTRASOUND IMAGING | Facility: HOSPITAL | Age: 61
End: 2021-02-16

## 2021-02-19 ENCOUNTER — APPOINTMENT (OUTPATIENT)
Dept: OCCUPATIONAL THERAPY | Facility: HOSPITAL | Age: 61
End: 2021-02-19

## 2021-02-24 ENCOUNTER — APPOINTMENT (OUTPATIENT)
Dept: ULTRASOUND IMAGING | Facility: HOSPITAL | Age: 61
End: 2021-02-24

## 2021-04-13 ENCOUNTER — HOSPITAL ENCOUNTER (OUTPATIENT)
Dept: GENERAL RADIOLOGY | Facility: HOSPITAL | Age: 61
Discharge: HOME | End: 2021-04-13
Admitting: FAMILY MEDICINE

## 2021-04-13 ENCOUNTER — TRANSCRIBE ORDERS (OUTPATIENT)
Dept: LAB | Facility: HOSPITAL | Age: 61
End: 2021-04-13

## 2021-04-13 DIAGNOSIS — R07.9 CHEST PAIN, UNSPECIFIED TYPE: Primary | ICD-10-CM

## 2021-04-13 DIAGNOSIS — R07.9 CHEST PAIN, UNSPECIFIED TYPE: ICD-10-CM

## 2021-04-13 PROCEDURE — 71046 X-RAY EXAM CHEST 2 VIEWS: CPT | Performed by: RADIOLOGY

## 2021-04-13 PROCEDURE — 71046 X-RAY EXAM CHEST 2 VIEWS: CPT

## 2021-05-04 ENCOUNTER — OFFICE VISIT (OUTPATIENT)
Dept: GYNECOLOGIC ONCOLOGY | Facility: CLINIC | Age: 61
End: 2021-05-04

## 2021-05-04 DIAGNOSIS — N90.89 LESION OF VULVA: ICD-10-CM

## 2021-05-04 DIAGNOSIS — C54.1 ENDOMETRIAL CANCER (HCC): Primary | ICD-10-CM

## 2021-05-04 PROCEDURE — 56605 BIOPSY OF VULVA/PERINEUM: CPT | Performed by: NURSE PRACTITIONER

## 2021-05-04 PROCEDURE — 88305 TISSUE EXAM BY PATHOLOGIST: CPT | Performed by: NURSE PRACTITIONER

## 2021-05-04 PROCEDURE — 99213 OFFICE O/P EST LOW 20 MIN: CPT | Performed by: NURSE PRACTITIONER

## 2021-05-04 PROCEDURE — 88312 SPECIAL STAINS GROUP 1: CPT | Performed by: NURSE PRACTITIONER

## 2021-05-07 LAB
CYTO UR: NORMAL
LAB AP CASE REPORT: NORMAL
LAB AP CLINICAL INFORMATION: NORMAL
LAB AP DIAGNOSIS COMMENT: NORMAL
PATH REPORT.FINAL DX SPEC: NORMAL
PATH REPORT.GROSS SPEC: NORMAL

## 2021-05-09 VITALS
HEART RATE: 55 BPM | WEIGHT: 199.7 LBS | SYSTOLIC BLOOD PRESSURE: 151 MMHG | HEIGHT: 65 IN | BODY MASS INDEX: 33.27 KG/M2 | DIASTOLIC BLOOD PRESSURE: 70 MMHG | TEMPERATURE: 98.2 F | RESPIRATION RATE: 17 BRPM

## 2021-05-10 RX ORDER — LIDOCAINE 50 MG/G
OINTMENT TOPICAL 3 TIMES DAILY
Qty: 30 G | Refills: 1 | Status: SHIPPED | OUTPATIENT
Start: 2021-05-10

## 2021-05-10 NOTE — TELEPHONE ENCOUNTER
I tried to call patient and it went to voicemail. Left he a message that I called her and she can return my phone call.

## 2021-05-10 NOTE — TELEPHONE ENCOUNTER
I spoke with MARK. She wants her to do silvadene and a lidocaine ointment mixed until the area is healed. Once that is healed she can begin the clobetasol cream.

## 2021-05-10 NOTE — TELEPHONE ENCOUNTER
Caller: JONA MONTES    Relationship: SELF    Best call back number: [phone]    What is the best time to reach you: ANY    Who are you requesting to speak with (clinical staff, provider,  specific staff member): DENI PERSON    What was the call regarding: PT IS CALLING TO SPEAK TO DENI. PT HAS CONCERNS THAT HER SITUATION HAS GOTTEN WORSE, AND THAT THE AREA THE PT'S BX WAS TAKEN FROM IS NOT HEALING    Do you require a callback: YES

## 2021-05-10 NOTE — TELEPHONE ENCOUNTER
Patient called me back we discussed per APRN that she should mix lidocaine ointment plus silvadene and put on the the wound 3-4 times a day.     Once that is healed apply clobetasol bid for a few weeks until healed. Once healed she should do this weekly as a maintenance.     She verbalized understanding.

## 2021-05-19 NOTE — THERAPY TREATMENT NOTE
Outpatient Occupational Therapy Lymphedema Treatment Note  DANUTA Giles     Patient Name: Paty Marie  : 1960  MRN: [id number]  Today's Date: 8/10/2020      Visit Date: 08/10/2020    Patient Active Problem List   Diagnosis   • Lupus anticoagulant disorder (CMS/HCC)   • Aspirin long-term use   • Lichen sclerosus   • Endometrial cancer (CMS/HCC)   • Palpitations   • H/O lupus anticoagulant disorder   • Essential hypertension   • Type 2 diabetes mellitus without complication, without long-term current use of insulin (CMS/HCC)   • Hypercholesteremia   • Hypothyroidism   • Dizziness   • Precordial pain   • Bilateral leg edema   • Shortness of breath   • Metabolic syndrome   • Hypomagnesemia   • Blood clotting disorder (CMS/HCC)   • OP (osteoporosis)   • Fatigue   • Anxiety and depression   • Epigastric pain   • Nausea        Past Medical History:   Diagnosis Date   • Arrhythmia    • Arthritis    • Blood clotting disorder (CMS/HCC)    • Coronary artery disease    • Diabetes (CMS/HCC)    • Diastolic dysfunction    • Disease of thyroid gland    • Elevated cholesterol    • Endometrial cancer (CMS/HCC) 2019   • History of brachytherapy 2020    vaginal brachytherapy   • Hyperlipidemia    • Hypertension    • Lupus anticoagulant disorder (CMS/HCC)    • PONV (postoperative nausea and vomiting)         Past Surgical History:   Procedure Laterality Date   • ABDOMINAL SURGERY     • CARDIAC CATHETERIZATION  2010    Dr. Gama- 30% Distal LAD   • CONVERTED (HISTORICAL) HOLTER  10/28/2019    - AVG 73. . Rare PAC   • ECHO - CONVERTED  2019    Dr. Reyez- EF 55%. LA- 3.9 Cm. Mild MR. RVSP- 33 mmHg.   • LAPAROSCOPIC TUBAL LIGATION     • NECK SURGERY     • NODE DISSECTION LAPAROSCOPIC N/A 2019    STAGING WITH DAVINCI ROBOT WITH LYMPH NODE DISSECTION;  Surgeon: Jennifer Holman MD;  Location: [facility];  Service: [facility]   • SKIN CANCER EXCISION     • TOTAL LAPAROSCOPIC HYSTERECTOMY SALPINGO  OOPHORECTOMY Bilateral 8/30/2019    TOTAL LAPAROSCOPIC HYSTERECTOMY BILATERAL SALPINGOOPHORECTOMY WITH DAVINCI ROBOT, BIOPSIES OF LEFT VULVAR SKIN LESIONS;  Surgeon: Jennifer Holman MD;  Location: [facility] OR;  Service: [facility]         Visit Dx:      ICD-10-CM ICD-9-CM   1. Bilateral leg edema R60.0 782.3   2. Endometrial cancer (CMS/HCC) C54.1 182.0   3. Shortness of breath R06.02 786.05       Lymphedema     Row Name 08/10/20 1400             Subjective Pain    Able to rate subjective pain?  yes  -BC      Pre-Treatment Pain Level  4  -BC      Post-Treatment Pain Level  2  -BC      Subjective Pain Comment  L knee  -BC         Subjective Comments    Subjective Comments  Pt. presents this date with less pain reported, and stating that her MD told her that the increased pain and swelling in her L knee was probable arthritis.  -BC         Lymphedema Assessment    Lymphedema Classification  LLE:;Trunk:  -BC      Lymphedema Cancer Related Sx  left;hysterectomy  -BC      Lymph Nodes Removed #  8  -BC      Positive Lymph Nodes #  0  -BC      Chemo Received  no  -BC      Radiation Therapy Received  yes  -BC      Infections or Cellulitis?  no  -BC         Posture/Observations    Alignment Options  Scapular elevation  -BC      Scapular Elevation  Mild;Right:;Standing posture;Sitting posture  -BC      Posture/Observations Comments  Pain in L knee and lower back  -BC         Lymphedema Edema Assessment    Ptting Edema Category  By grade out of 4  -BC      Pitting Edema  + 2/4  -BC      Stemmer Sign  negative  -BC         Skin Changes/Observations    Location/Assessment  Lower Extremity  -BC      Lower Extremity Conditions  left:;clean;dry  -BC      Lower Extremity Color/Pigment  left:;blanchable  -BC         Lymphedema Sensation    Lymphedema Sensation Reports  LLE:  -BC      Lymphedema Sensation Tests  light touch;deep touch  -BC      Lymphedema Light Touch  LLE:;WNL  -BC      Lymphedema Deep Touch  LLE:;mild impairment  -BC          Lymphedema Measurements    Measurement Type(s)  Quick Girth;Circumferential  -BC      Quick Girth Areas  Lower extremities  -BC      Circumferential Areas  Trunk  -BC         LLE Quick Girth (cm)    Met-heads  24.6 cm  -BC      Mid foot  25.2 cm  -BC      Smallest ankle  23.8 cm  -BC      Largest calf  41 cm  -BC      Tib tuberosity  40.4 cm  -BC      Mid patella  50.5 cm  -BC      Distal thigh  54 cm  -BC      Proximal thigh  65 cm  -BC      Other 1  25.4 cm  -BC      Other 2  36.5 cm  -BC         Trunk Circumferential (cm)    Measurement Location 1  Navel  -BC      Measurement Location 2  below breast  -BC      Measurement Location 3  top of hip  -BC         Manual Lymphatic Drainage    Manual Lymphatic Drainage  extremity treatment;opened regional lymph nodes  -BC      Opened Regional Lymph Nodes  inguinal  -BC      Extremity Treatment  MLD to full limb  -BC      MLD to Full Limb  BLE's  -BC      Manual Therapy  MLD to BLE's, inguinals, Abd.,  -BC         Compression/Skin Care    Compression/Skin Care  skin care;bandaging;wrapping location  -BC      Skin Care  moisturizing lotion applied  -BC      Wrapping Location  lower extremity  -BC      Wrapping Location LE  left: base of toes to mid thigh  -BC      Bandage Layers  cotton elastic stocking- single layer (comment size);short-stretch bandages (comment size/quantity);padding/fluff layer  -BC      Bandaging Comments  Artiflex and 2 step bandaging on LLE, small stockinette from foot to knee, large stockinette knee to groin.  -BC      Bandaging Technique  light compression;circumferential/spiral;figure-eight  -BC      Compression/Skin Care Comments  Compression pump x Abd., inguinals, BLE's.  -BC        User Key  (r) = Recorded By, (t) = Taken By, (c) = Cosigned By    Initials Name Provider Type    Angie Camargo OT Occupational Therapist                  OT Assessment/Plan     Row Name 08/10/20 0185          OT Assessment    Assessment Comments  Pt.  positioned in supported recline for manual lymph drainage, compression pump, skin care and multi layered bandaging of LLE.  -BC       User Key  (r) = Recorded By, (t) = Taken By, (c) = Cosigned By    Initials Name Provider Type    BC Angie Coburn OT Occupational Therapist                    Therapy Education  Given: HEP, Symptoms/condition management, Edema management, Bandaging/dressing change  Program: Reinforced  How Provided: Verbal, Demonstration  Provided to: Patient  Level of Understanding: Verbalized                Time Calculation:   OT Start Time: 1400  OT Stop Time: 1530  OT Time Calculation (min): 90 min  OT Non-Billable Time (min): 25 min     Therapy Charges for Today     Code Description Service Date Service Provider Modifiers Qty    41016665661 HC OT LYMPHEDEMA MANAGEMENT-15 MIN 8/10/2020 Angie Coburn OT  1    15204183516 HC OT SELF CARE/MGMT/TRAIN EA 15 MIN 8/10/2020 Angie Coburn OT GO 1    26431390165 HC OT MANUAL THERAPY EA 15 MIN 8/10/2020 Angie Coburn OT GO 3    07745953649 HC OT VASOPNEUMAT DEVIC 1 OR MORE AREAS 8/10/2020 Angie Coburn OT GO 1                      Angie Coburn OT  8/10/2020   Take over the counter pain medication

## 2021-06-15 ENCOUNTER — HOSPITAL ENCOUNTER (EMERGENCY)
Dept: HOSPITAL 79 - ER1 | Age: 61
Discharge: HOME | End: 2021-06-15
Payer: COMMERCIAL

## 2021-06-15 DIAGNOSIS — U07.1: Primary | ICD-10-CM

## 2021-06-15 DIAGNOSIS — Z88.8: ICD-10-CM

## 2021-06-15 LAB
BUN/CREATININE RATIO: 18 (ref 0–10)
HGB BLD-MCNC: 12.6 GM/DL (ref 12.3–15.3)
RED BLOOD COUNT: 4.18 M/UL (ref 4–5.1)
WHITE BLOOD COUNT: 4.1 K/UL (ref 4.5–11)

## 2021-07-13 ENCOUNTER — HOSPITAL ENCOUNTER (OUTPATIENT)
Dept: HOSPITAL 79 - KOH-I | Age: 61
End: 2021-07-13
Attending: NURSE PRACTITIONER
Payer: COMMERCIAL

## 2021-07-13 DIAGNOSIS — R06.02: Primary | ICD-10-CM

## 2021-08-04 ENCOUNTER — HOSPITAL ENCOUNTER (OUTPATIENT)
Dept: HOSPITAL 79 - LAB | Age: 61
End: 2021-08-04
Attending: NURSE PRACTITIONER
Payer: COMMERCIAL

## 2021-08-04 DIAGNOSIS — I49.1: Primary | ICD-10-CM

## 2021-08-04 DIAGNOSIS — M79.10: ICD-10-CM

## 2021-08-04 DIAGNOSIS — M25.50: ICD-10-CM

## 2021-08-05 LAB — RHEUMATOID ARTHRITIS FACTOR: <10 IU/ML (ref 0–13.9)

## 2021-08-13 ENCOUNTER — TRANSCRIBE ORDERS (OUTPATIENT)
Dept: ADMINISTRATIVE | Facility: HOSPITAL | Age: 61
End: 2021-08-13

## 2021-08-13 ENCOUNTER — APPOINTMENT (OUTPATIENT)
Dept: CT IMAGING | Facility: HOSPITAL | Age: 61
End: 2021-08-13

## 2021-08-13 ENCOUNTER — HOSPITAL ENCOUNTER (OUTPATIENT)
Dept: CT IMAGING | Facility: HOSPITAL | Age: 61
Discharge: HOME | End: 2021-08-13
Admitting: FAMILY MEDICINE

## 2021-08-13 DIAGNOSIS — R10.9 ABDOMINAL PAIN, UNSPECIFIED ABDOMINAL LOCATION: Primary | ICD-10-CM

## 2021-08-13 DIAGNOSIS — R10.9 STOMACH PAIN: Primary | ICD-10-CM

## 2021-08-13 DIAGNOSIS — R10.9 STOMACH PAIN: ICD-10-CM

## 2021-08-13 PROCEDURE — 74176 CT ABD & PELVIS W/O CONTRAST: CPT

## 2021-08-13 PROCEDURE — 74176 CT ABD & PELVIS W/O CONTRAST: CPT | Performed by: RADIOLOGY

## 2021-08-24 ENCOUNTER — TELEPHONE (OUTPATIENT)
Dept: GYNECOLOGIC ONCOLOGY | Facility: CLINIC | Age: 61
End: 2021-08-24

## 2021-08-24 NOTE — TELEPHONE ENCOUNTER
Caller: Paty Marie    Relationship to patient: Self    Best call back number: [phone]    Type of visit: FOLLOW UP    Requested date: SOMETIME LATE September, MID MORNING TO AFTERNOON    If rescheduling, when is the original appointment: 08/25    Additional notes: PLEASE CALL ONCE R/S. HUB UNABLE TO R/S WITHIN TIMEFRAME.

## 2021-11-09 ENCOUNTER — TELEPHONE (OUTPATIENT)
Dept: GYNECOLOGIC ONCOLOGY | Facility: CLINIC | Age: 61
End: 2021-11-09

## 2021-11-09 NOTE — TELEPHONE ENCOUNTER
Caller: JONA MONTES    Relationship to patient: SELF    Best call back number: [phone]    Chief complaint: PT IS WANTING TO SCHEDULE FU APT WITH DENI PERSON     Type of visit: F/U     Requested date: ANY WAY POSSIBLE ON Thursday  -11/11/2021    Additional notes: PT KEEPS HER GRAND BABY AND IS OFF ON Thursday

## 2021-12-07 ENCOUNTER — HOSPITAL ENCOUNTER (OUTPATIENT)
Dept: GENERAL RADIOLOGY | Facility: HOSPITAL | Age: 61
Discharge: HOME | End: 2021-12-07
Admitting: FAMILY MEDICINE

## 2021-12-07 ENCOUNTER — TRANSCRIBE ORDERS (OUTPATIENT)
Dept: LAB | Facility: HOSPITAL | Age: 61
End: 2021-12-07

## 2021-12-07 DIAGNOSIS — R05.9 COUGH: Primary | ICD-10-CM

## 2021-12-07 DIAGNOSIS — R05.9 COUGH: ICD-10-CM

## 2021-12-07 PROCEDURE — 71046 X-RAY EXAM CHEST 2 VIEWS: CPT

## 2021-12-07 PROCEDURE — 71046 X-RAY EXAM CHEST 2 VIEWS: CPT | Performed by: RADIOLOGY

## 2021-12-09 ENCOUNTER — TELEPHONE (OUTPATIENT)
Dept: GYNECOLOGIC ONCOLOGY | Facility: CLINIC | Age: 61
End: 2021-12-09

## 2021-12-09 NOTE — TELEPHONE ENCOUNTER
Caller: Paty Marie    Relationship: Self    Best call back number: [phone]    What is the best time to reach you: ANYTIME    Who are you requesting to speak with (clinical staff, provider,  specific staff member): DENI FLORES OR NURSE    What was the call regarding: PT IS SICK AGAIN TODAY AND DOESN'T THINK SHE CAN MAKE HER APPT. SHE WANTED TO SEE IF SHE COULD POSSIBLY SWITCH THE VISIT TO A PHONE CALL. TRIED TO W/T PT TO OFFICE BUT WAS UNSUCCESSFUL.     Do you require a callback: YES, PLS CALL PT ASAP TO ADVISE IF APPT CAN BE CHANGED TO PHONE VISIT.

## 2021-12-20 ENCOUNTER — OFFICE VISIT (OUTPATIENT)
Dept: GYNECOLOGIC ONCOLOGY | Facility: CLINIC | Age: 61
End: 2021-12-20

## 2021-12-20 VITALS
RESPIRATION RATE: 18 BRPM | SYSTOLIC BLOOD PRESSURE: 166 MMHG | WEIGHT: 199 LBS | HEART RATE: 52 BPM | TEMPERATURE: 97.3 F | BODY MASS INDEX: 33.15 KG/M2 | OXYGEN SATURATION: 98 % | HEIGHT: 65 IN | DIASTOLIC BLOOD PRESSURE: 79 MMHG

## 2021-12-20 DIAGNOSIS — L90.0 LICHEN SCLEROSUS: ICD-10-CM

## 2021-12-20 DIAGNOSIS — Z85.42 HISTORY OF ENDOMETRIAL CANCER: Primary | ICD-10-CM

## 2021-12-20 PROCEDURE — 99213 OFFICE O/P EST LOW 20 MIN: CPT | Performed by: NURSE PRACTITIONER

## 2021-12-20 RX ORDER — LEVOTHYROXINE SODIUM 0.07 MG/1
75 TABLET ORAL DAILY
COMMUNITY
Start: 2021-11-29

## 2021-12-20 RX ORDER — CLOBETASOL PROPIONATE 0.5 MG/G
CREAM TOPICAL 2 TIMES DAILY PRN
Qty: 60 G | Refills: 2 | Status: SHIPPED | OUTPATIENT
Start: 2021-12-20

## 2021-12-20 NOTE — PROGRESS NOTES
GYN ONCOLOGY CANCER SURVEILLANCE FOLLOW-UP    Paty Marie  [id number]  1960    Subjective   Chief Complaint: Uterine Cancer (No GYN Complaints)        History of present illness:     Paty Marie is a 61 y.o. year old female who is here today for ongoing surveillance of Endometrial Cancer, see Cancer History. She will reach 2 years out from completion of treatment this winter. She was last seen in 5/2021, did not return for last scheduled 3 month visit.   She also has a history of lichen sclerosus, biopsy proven in 5/2021. She requests refills of her clobetasol, feels this is very helpful. Since our last visit she has been diagnosed with Lupus and diverticulitis. She is working with PCP and GI in [place], [place] on management.   Patient is feeling generally well today and has no gyn complaints. She denies vaginal bleeding, pelvic pain, concerning lesions, or changes in bowel or bladder function.       Cancer History:   Oncology/Hematology History   Endometrial cancer (HCC)   6/26/2019 Biopsy    EMB by Dr. Juju Chadwick for postmenopausal bleeding with ultrasound showing endometrium thickened to 8.8 mm.   Pathology showed complex atypical hyperplasia. Referred to Gyn Oncology     8/30/2019 Surgery    RTLH/BSO and left vulvar biopsies.     Pathology showed 4.0 x 3.0 x 1.0 cm grade 1 endometrioid adenocarcinoma, invasive into 11/18 mm myometrium (>50%). Vulvar biopsies benign. Lymphvascular space invasion positive. MSI testing normal.      9/19/2019 Imaging    CT abdomen pelvis negative for metastatic disease     11/22/2019 Surgery    Lymph node dissection and staging with robot. All nodes negative.   Final Stage IB grade 1     2/3/2020 - 2/12/2020 Radiation    Radiation OncologyTreatment Course:  Paty Marie received 3000 cGy in 5 fractions to vagina via High Dose Radiation - HDR.     5/19/2020 Survivorship    Survivorship Care Plan completed and discussed with patient.  Copy of Survivorship Care Plan provided to  "patient and primary care provider.           The current medication list and allergy list were reviewed and reconciled.     Past Medical History, Past Surgical History, Social History, Family History have been reviewed and are without significant changes except as mentioned.      Review of Systems   Constitutional: Positive for fatigue.   Musculoskeletal: Positive for arthralgias and back pain (chronic).         Objective   Physical Exam  Vital Signs: /79 Comment: RUE  Pulse 52   Temp 97.3 °F (36.3 °C) (Infrared)   Resp 18   Ht 165.1 cm (65\")   Wt 90.3 kg (199 lb)   SpO2 98% Comment: RA  BMI 33.12 kg/m²   Vitals:    12/20/21 1602   PainSc: 5  Comment: Back & Left Leg           General Appearance:  alert, cooperative, no apparent distress and appears stated age   Neurologic/Psychiatric: A&O x 3, gait steady, appropriate affect   HEENT:  Normocephalic, without obvious abnormality, mucous membranes moist   Abdomen:   Soft, non-tender, non-distended and no organomegaly   Extremities: Atraumatic, no clubbing, no cyanosis. BLE edema, left > right, unchanged   Lymph nodes: No cervical, supraclavicular, inguinal adenopathy noted   Pelvic: External Genitalia  Atrophic, changes consistent with lichen sclerosus, stable. No concerning lesions  Vagina  Atrophic, no lesions appreciated  Vaginal Cuff  Female Vaginal Cuff: smooth, intact, without visible lesions and changes consistent with previous radiation.   Uterus  surgically absent and no palpable masses  Ovaries  surgically absent bilaterally  Parametria  smooth  Rectovaginal  Female rectovaginal: deferred     ECOG score: 0             Result Review :   Last imaging study was CT abdomen pelvis 8/13/2021           Assessment and Plan:    Diagnoses and all orders for this visit:    History of endometrial cancer    Lichen sclerosus  -     clobetasol (TEMOVATE) 0.05 % cream; Apply  topically to the appropriate area as directed 2 (Two) Times a Day As Needed " (itching/irritation).            There is no evidence of recurrent endometrial cancer upon today's exam. Plan for one more 3 month visit to surpass the 2 year radha, then if doing well may go to 6 month visits.  She is understanding to call with any changes in pelvic symptoms or general GYN concerns at any time between regularly scheduled visits.     Clobetasol refilled. We reviewed medication instructions, risks, benefits, and potential side effects.       Pain assessment was performed today as a part of patient’s care.  For patients with pain related to surgery, gynecologic malignancy or cancer treatment, the plan is as noted in the assessment/plan.  For patients with pain not related to these issues, they are to seek any further needed care from a more appropriate provider, such as PCP or ortho. See above.       Follow-up:     Return to clinic in 3 months for ongoing cancer surveillance.      Electronically signed by MARK Doll on 12/30/21 at 08:50 EST

## 2022-02-25 ENCOUNTER — HOSPITAL ENCOUNTER (OUTPATIENT)
Dept: HOSPITAL 79 - KOH-I | Age: 62
End: 2022-02-25
Attending: INTERNAL MEDICINE
Payer: COMMERCIAL

## 2022-02-25 DIAGNOSIS — R10.10: Primary | ICD-10-CM

## 2022-02-25 DIAGNOSIS — K76.0: ICD-10-CM

## 2022-03-22 ENCOUNTER — TELEPHONE (OUTPATIENT)
Dept: GYNECOLOGIC ONCOLOGY | Facility: CLINIC | Age: 62
End: 2022-03-22

## 2022-03-22 NOTE — TELEPHONE ENCOUNTER
Caller: Paty Marie    Relationship to patient: Self    Best call back number: [phone]    Chief complaint: NEED TO R/S CANCELLED APPOINTMENT 03/21    Type of visit: FOLLOW UP     Requested date: ANY DAY  AND WOULD PREFER EVENING APPOINTMENT TIME     If rescheduling, when is the original appointment: 03/21    Additional notes:

## 2022-04-01 ENCOUNTER — HOSPITAL ENCOUNTER (OUTPATIENT)
Dept: GENERAL RADIOLOGY | Facility: HOSPITAL | Age: 62
Discharge: HOME | End: 2022-04-01
Admitting: FAMILY MEDICINE

## 2022-04-01 ENCOUNTER — TRANSCRIBE ORDERS (OUTPATIENT)
Dept: LAB | Facility: HOSPITAL | Age: 62
End: 2022-04-01

## 2022-04-01 DIAGNOSIS — R10.84 GENERALIZED ABDOMINAL PAIN: ICD-10-CM

## 2022-04-01 DIAGNOSIS — R10.84 GENERALIZED ABDOMINAL PAIN: Primary | ICD-10-CM

## 2022-04-01 PROCEDURE — 74022 RADEX COMPL AQT ABD SERIES: CPT | Performed by: RADIOLOGY

## 2022-04-01 PROCEDURE — 74022 RADEX COMPL AQT ABD SERIES: CPT

## 2022-04-19 ENCOUNTER — HOSPITAL ENCOUNTER (OUTPATIENT)
Dept: CT IMAGING | Facility: HOSPITAL | Age: 62
Discharge: HOME | End: 2022-04-19
Admitting: FAMILY MEDICINE

## 2022-04-19 DIAGNOSIS — R10.9 ABDOMINAL PAIN, UNSPECIFIED ABDOMINAL LOCATION: ICD-10-CM

## 2022-04-19 PROCEDURE — 74176 CT ABD & PELVIS W/O CONTRAST: CPT

## 2022-04-19 PROCEDURE — 74176 CT ABD & PELVIS W/O CONTRAST: CPT | Performed by: RADIOLOGY

## 2022-04-25 ENCOUNTER — APPOINTMENT (OUTPATIENT)
Dept: CT IMAGING | Facility: HOSPITAL | Age: 62
End: 2022-04-25

## 2022-06-29 ENCOUNTER — TRANSCRIBE ORDERS (OUTPATIENT)
Dept: WOUND CARE | Facility: HOSPITAL | Age: 62
End: 2022-06-29

## 2022-06-29 DIAGNOSIS — I89.0 ACQUIRED LYMPHEDEMA OF LEG: Primary | ICD-10-CM

## 2022-07-06 ENCOUNTER — APPOINTMENT (OUTPATIENT)
Dept: OCCUPATIONAL THERAPY | Facility: HOSPITAL | Age: 62
End: 2022-07-06

## 2022-09-07 ENCOUNTER — OFFICE VISIT (OUTPATIENT)
Dept: GYNECOLOGIC ONCOLOGY | Facility: CLINIC | Age: 62
End: 2022-09-07

## 2022-09-07 VITALS
OXYGEN SATURATION: 97 % | SYSTOLIC BLOOD PRESSURE: 135 MMHG | WEIGHT: 192 LBS | BODY MASS INDEX: 31.95 KG/M2 | TEMPERATURE: 97.5 F | RESPIRATION RATE: 16 BRPM | DIASTOLIC BLOOD PRESSURE: 76 MMHG | HEART RATE: 58 BPM

## 2022-09-07 DIAGNOSIS — L90.0 LICHEN SCLEROSUS: ICD-10-CM

## 2022-09-07 DIAGNOSIS — Z85.42 HISTORY OF ENDOMETRIAL CANCER: Primary | ICD-10-CM

## 2022-09-07 PROBLEM — K21.9 GASTROESOPHAGEAL REFLUX DISEASE WITHOUT ESOPHAGITIS: Status: ACTIVE | Noted: 2022-09-07

## 2022-09-07 PROBLEM — I89.0 ACQUIRED LYMPHEDEMA OF LOWER EXTREMITY: Status: ACTIVE | Noted: 2022-09-07

## 2022-09-07 PROBLEM — I25.9 CHRONIC ISCHEMIC HEART DISEASE: Status: ACTIVE | Noted: 2022-09-07

## 2022-09-07 PROCEDURE — 99213 OFFICE O/P EST LOW 20 MIN: CPT | Performed by: NURSE PRACTITIONER

## 2022-09-07 RX ORDER — SEMAGLUTIDE 1.34 MG/ML
INJECTION, SOLUTION SUBCUTANEOUS
COMMUNITY
Start: 2022-08-22

## 2022-09-07 RX ORDER — ASPIRIN 325 MG
325 TABLET, DELAYED RELEASE (ENTERIC COATED) ORAL DAILY
COMMUNITY
Start: 2022-08-22

## 2022-09-07 NOTE — PROGRESS NOTES
GYN ONCOLOGY CANCER SURVEILLANCE FOLLOW-UP    Paty Marie  [id number]  1960    Subjective   Chief Complaint: Uterine Cancer and Follow-up (Lichen sclerosus)        History of present illness:     Paty Marie is a 62 y.o. year old female who is here today for ongoing surveillance of Endometrial Cancer, see Cancer History. She will reach 3 years out from completion of treatment this winter. She was last seen in 12/2021, did not return for last several 3 month visits.   She also has a history of lichen sclerosus, biopsy proven in 5/2021, continues clobetasol PRN.   Patient is feeling generally well today and has no gyn complaints. She denies vaginal bleeding, pelvic pain, concerning lesions, or changes in bowel or bladder function. Since our last visit she has been started on Ozempic by her PCP for management of DM2.       Cancer History:   Oncology/Hematology History   Endometrial cancer (HCC)   6/26/2019 Biopsy    EMB by Dr. Juju Chadwick for postmenopausal bleeding with ultrasound showing endometrium thickened to 8.8 mm.   Pathology showed complex atypical hyperplasia. Referred to Gyn Oncology     8/30/2019 Surgery    RTLH/BSO and left vulvar biopsies.     Pathology showed 4.0 x 3.0 x 1.0 cm grade 1 endometrioid adenocarcinoma, invasive into 11/18 mm myometrium (>50%). Vulvar biopsies benign. Lymphvascular space invasion positive. MSI testing normal.      9/19/2019 Imaging    CT abdomen pelvis negative for metastatic disease     11/22/2019 Surgery    Lymph node dissection and staging with robot. All nodes negative.   Final Stage IB grade 1     2/3/2020 - 2/12/2020 Radiation    Radiation OncologyTreatment Course:  Paty Marie received 3000 cGy in 5 fractions to vagina via High Dose Radiation - HDR.     5/19/2020 Survivorship    Survivorship Care Plan completed and discussed with patient.  Copy of Survivorship Care Plan provided to patient and primary care provider.           The current medication list and  allergy list were reviewed and reconciled.     Past Medical History, Past Surgical History, Social History, Family History have been reviewed and are without significant changes except as mentioned.      Review of Systems   Constitutional: Positive for fatigue.   Respiratory: Negative.    Cardiovascular: Negative.    Gastrointestinal: Negative.    Genitourinary: Negative.    Musculoskeletal: Positive for arthralgias and back pain (chronic).   Psychiatric/Behavioral: Negative.          Objective   Physical Exam  Vital Signs: /76   Pulse 58   Temp 97.5 °F (36.4 °C)   Resp 16   Wt 87.1 kg (192 lb)   SpO2 97%   BMI 31.95 kg/m²   Vitals:    09/07/22 1446   PainSc:   5   PainLoc: Back           General Appearance:  alert, cooperative, no apparent distress and appears stated age   Neurologic/Psychiatric: A&O x 3, gait steady, appropriate affect   HEENT:  Normocephalic, without obvious abnormality, mucous membranes moist   Abdomen:   Soft, non-tender, non-distended and no organomegaly   Extremities: Atraumatic, no clubbing, no cyanosis. BLE edema, left > right, unchanged   Lymph nodes: No cervical, supraclavicular, inguinal adenopathy noted   Pelvic: External Genitalia  Atrophic, changes consistent with lichen sclerosus, stable. No concerning lesions  Vagina  Atrophic, no lesions appreciated  Vaginal Cuff  Female Vaginal Cuff: smooth, intact, without visible lesions and changes consistent with previous radiation.   Uterus  surgically absent and no palpable masses  Ovaries  surgically absent bilaterally  Parametria  smooth  Rectovaginal  Female rectovaginal: deferred                   Result Review :   Last imaging study was CT abdomen pelvis 4/19/2022           Assessment and Plan:    Diagnoses and all orders for this visit:    History of endometrial cancer    Lichen sclerosus          There is no evidence of recurrent endometrial cancer upon today's exam. She is approaching 3 years from completion of treatment  this winter. Plan for every 6 month cancer surveillance visits in office until the 5 year radha.  She is understanding to call with any changes in pelvic symptoms or general GYN concerns at any time between regularly scheduled visits.     Lichen sclerosus stable, unchanged. No new or concerning lesions. Continue clobetasol PRN, may use twice daily during acute flare-ups. We reviewed medication instructions, risks, benefits, and potential side effects.       Pain assessment was performed today as a part of patient’s care.  For patients with pain related to surgery, gynecologic malignancy or cancer treatment, the plan is as noted in the assessment/plan.  For patients with pain not related to these issues, they are to seek any further needed care from a more appropriate provider, such as PCP or ortho. See above.       Follow-up:     Return to clinic in 6 months for ongoing cancer surveillance.      Electronically signed by MARK Doll on 09/27/22 at 13:09 EDT

## 2023-11-01 ENCOUNTER — TELEPHONE (OUTPATIENT)
Dept: GYNECOLOGIC ONCOLOGY | Facility: CLINIC | Age: 63
End: 2023-11-01
Payer: COMMERCIAL

## 2023-11-01 NOTE — TELEPHONE ENCOUNTER
Caller: Paty Marie    Relationship: Self    Best call back number: [phone]    What is the best time to reach you: ANY    Who are you requesting to speak with (clinical staff, provider,  specific staff member): SCHEDULING        What was the call regarding: PATIENT CALLED TO SCHEDULE PAP SMEAR APPT WITH DENI PERSON    Is it okay if the provider responds through MyChart: NO

## (undated) DEVICE — OBT BLADLES ENDOWRIST DAVINCI/S 8MM

## (undated) DEVICE — PK MAJ GYN DAVINCI 10

## (undated) DEVICE — Device

## (undated) DEVICE — FLTR PLUMEPORT LAP W/CONN STRL

## (undated) DEVICE — PAD ARMBRD SURG CONVOL 7.5X20X2IN

## (undated) DEVICE — SUT MNCRYL PLS ANTIB UD 3/0 PS2 27IN

## (undated) DEVICE — APPL CHLORAPREP W/TINT 26ML BLU

## (undated) DEVICE — ANTIBACTERIAL UNDYED BRAIDED (POLYGLACTIN 910), SYNTHETIC ABSORBABLE SUTURE: Brand: COATED VICRYL

## (undated) DEVICE — ENDOPATH XCEL BLADELESS TROCARS WITH STABILITY SLEEVES: Brand: ENDOPATH XCEL

## (undated) DEVICE — COVER,LIGHT HANDLE,FLX,1/PK: Brand: MEDLINE INDUSTRIES, INC.

## (undated) DEVICE — GLV SURG SENSICARE MICRO PF LF 6 STRL

## (undated) DEVICE — MANIP UTER RUMI TP 6.7MMX8CM BLU

## (undated) DEVICE — SHEET, DRAPE, SPLIT, STERILE: Brand: MEDLINE

## (undated) DEVICE — GLV SURG DERMASSURE GRN LF PF SZ 6.5

## (undated) DEVICE — INTENDED FOR TISSUE SEPARATION, AND OTHER PROCEDURES THAT REQUIRE A SHARP SURGICAL BLADE TO PUNCTURE OR CUT.: Brand: BARD-PARKER ® STAINLESS STEEL BLADES

## (undated) DEVICE — [HIGH FLOW INSUFFLATOR,  DO NOT USE IF PACKAGE IS DAMAGED,  KEEP DRY,  KEEP AWAY FROM SUNLIGHT,  PROTECT FROM HEAT AND RADIOACTIVE SOURCES.]: Brand: PNEUMOSURE

## (undated) DEVICE — BNDR ABD PREMIUM/UNIV 10IN 27TO48IN

## (undated) DEVICE — TIP COVER ACCESSORY

## (undated) DEVICE — ANTIBACTERIAL UNDYED BRAIDED (POLYGLACTIN 910), SYNTHETIC ABSORBABLE SURGICAL SUTURE: Brand: COATED VICRYL

## (undated) DEVICE — SKIN AFFIX SURG ADHESIVE 72/CS 0.55ML: Brand: MEDLINE

## (undated) DEVICE — GOWN,NON-REINFORCED,SIRUS,SET IN SLV,XL: Brand: MEDLINE

## (undated) DEVICE — TISSUE RETRIEVAL SYSTEM: Brand: INZII RETRIEVAL SYSTEM

## (undated) DEVICE — MANIP UTER RUMI 2 KOH EFFICIENT 3CM BL

## (undated) DEVICE — DRP ADAPT ALLY UTER POSTN SYS 1P/U